# Patient Record
Sex: FEMALE | Race: OTHER
[De-identification: names, ages, dates, MRNs, and addresses within clinical notes are randomized per-mention and may not be internally consistent; named-entity substitution may affect disease eponyms.]

---

## 2017-05-19 ENCOUNTER — HOSPITAL ENCOUNTER (INPATIENT)
Dept: HOSPITAL 54 - ER | Age: 67
LOS: 3 days | Discharge: HOME | DRG: 69 | End: 2017-05-22
Attending: INTERNAL MEDICINE | Admitting: INTERNAL MEDICINE
Payer: MEDICARE

## 2017-05-19 VITALS — BODY MASS INDEX: 28.86 KG/M2 | WEIGHT: 147 LBS | HEIGHT: 60 IN

## 2017-05-19 VITALS — DIASTOLIC BLOOD PRESSURE: 78 MMHG | SYSTOLIC BLOOD PRESSURE: 151 MMHG

## 2017-05-19 DIAGNOSIS — I50.32: ICD-10-CM

## 2017-05-19 DIAGNOSIS — E78.5: ICD-10-CM

## 2017-05-19 DIAGNOSIS — N39.0: ICD-10-CM

## 2017-05-19 DIAGNOSIS — I70.0: ICD-10-CM

## 2017-05-19 DIAGNOSIS — D32.9: ICD-10-CM

## 2017-05-19 DIAGNOSIS — I25.10: ICD-10-CM

## 2017-05-19 DIAGNOSIS — R79.89: ICD-10-CM

## 2017-05-19 DIAGNOSIS — G89.29: ICD-10-CM

## 2017-05-19 DIAGNOSIS — M19.90: ICD-10-CM

## 2017-05-19 DIAGNOSIS — G45.9: Primary | ICD-10-CM

## 2017-05-19 DIAGNOSIS — M79.606: ICD-10-CM

## 2017-05-19 DIAGNOSIS — I11.0: ICD-10-CM

## 2017-05-19 DIAGNOSIS — E87.6: ICD-10-CM

## 2017-05-19 DIAGNOSIS — F17.210: ICD-10-CM

## 2017-05-19 LAB
ALBUMIN SERPL BCP-MCNC: 3.8 G/DL (ref 3.4–5)
ALP SERPL-CCNC: 63 U/L (ref 46–116)
ALT SERPL W P-5'-P-CCNC: 46 U/L (ref 12–78)
APTT PPP: 25 SEC (ref 23–34)
APTT PPP: 28 SEC (ref 23–34)
AST SERPL W P-5'-P-CCNC: 15 U/L (ref 15–37)
BASOPHILS # BLD AUTO: 0 /CMM (ref 0–0.2)
BASOPHILS # BLD AUTO: 0.1 /CMM (ref 0–0.2)
BASOPHILS NFR BLD AUTO: 0.6 % (ref 0–2)
BASOPHILS NFR BLD AUTO: 0.6 % (ref 0–2)
BILIRUB SERPL-MCNC: 0.5 MG/DL (ref 0.2–1)
BUN SERPL-MCNC: 22 MG/DL (ref 7–18)
BUN SERPL-MCNC: 24 MG/DL (ref 7–18)
CALCIUM SERPL-MCNC: 9.5 MG/DL (ref 8.5–10.1)
CALCIUM SERPL-MCNC: 9.8 MG/DL (ref 8.5–10.1)
CHLORIDE SERPL-SCNC: 106 MMOL/L (ref 98–107)
CHLORIDE SERPL-SCNC: 106 MMOL/L (ref 98–107)
CHOLEST SERPL-MCNC: 195 MG/DL (ref ?–200)
CO2 SERPL-SCNC: 28 MMOL/L (ref 21–32)
CO2 SERPL-SCNC: 34 MMOL/L (ref 21–32)
CREAT SERPL-MCNC: 1 MG/DL (ref 0.6–1.3)
CREAT SERPL-MCNC: 1.1 MG/DL (ref 0.6–1.3)
EOSINOPHIL # BLD AUTO: 0.3 /CMM (ref 0–0.7)
EOSINOPHIL # BLD AUTO: 0.3 /CMM (ref 0–0.7)
EOSINOPHIL NFR BLD AUTO: 3.2 % (ref 0–6)
EOSINOPHIL NFR BLD AUTO: 4.1 % (ref 0–6)
GLUCOSE SERPL-MCNC: 117 MG/DL (ref 74–106)
GLUCOSE SERPL-MCNC: 130 MG/DL (ref 74–106)
HCT VFR BLD AUTO: 39 % (ref 33–45)
HCT VFR BLD AUTO: 40 % (ref 33–45)
HDLC SERPL-MCNC: 62 MG/DL (ref 40–60)
HGB BLD-MCNC: 13 G/DL (ref 11.5–14.8)
HGB BLD-MCNC: 13.6 G/DL (ref 11.5–14.8)
INR PPP: 0.97 (ref 0.87–1.13)
INR PPP: 0.97 (ref 0.87–1.13)
LDLC SERPL DIRECT ASSAY-MCNC: 105 MG/DL (ref 0–99)
LYMPHOCYTES NFR BLD AUTO: 2 /CMM (ref 0.8–4.8)
LYMPHOCYTES NFR BLD AUTO: 2.2 /CMM (ref 0.8–4.8)
LYMPHOCYTES NFR BLD AUTO: 26.4 % (ref 20–44)
LYMPHOCYTES NFR BLD AUTO: 26.6 % (ref 20–44)
MCH RBC QN AUTO: 29 PG (ref 26–33)
MCH RBC QN AUTO: 30 PG (ref 26–33)
MCHC RBC AUTO-ENTMCNC: 34 G/DL (ref 31–36)
MCHC RBC AUTO-ENTMCNC: 34 G/DL (ref 31–36)
MCV RBC AUTO: 87 FL (ref 82–100)
MCV RBC AUTO: 87 FL (ref 82–100)
MONOCYTES NFR BLD AUTO: 0.5 /CMM (ref 0.1–1.3)
MONOCYTES NFR BLD AUTO: 0.6 /CMM (ref 0.1–1.3)
MONOCYTES NFR BLD AUTO: 5.7 % (ref 2–12)
MONOCYTES NFR BLD AUTO: 8.1 % (ref 2–12)
NEUTROPHILS # BLD AUTO: 4.6 /CMM (ref 1.8–8.9)
NEUTROPHILS # BLD AUTO: 5.3 /CMM (ref 1.8–8.9)
NEUTROPHILS NFR BLD AUTO: 60.6 % (ref 43–81)
NEUTROPHILS NFR BLD AUTO: 64.1 % (ref 43–81)
NT-PROBNP SERPL-MCNC: 398 PG/ML (ref 0–125)
PLATELET # BLD AUTO: 162 /CMM (ref 150–450)
PLATELET # BLD AUTO: 167 /CMM (ref 150–450)
POTASSIUM SERPL-SCNC: 3.3 MMOL/L (ref 3.5–5.1)
POTASSIUM SERPL-SCNC: 3.7 MMOL/L (ref 3.5–5.1)
PROT SERPL-MCNC: 7 G/DL (ref 6.4–8.2)
PROTHROMBIN TIME: 10.1 SECS (ref 9.5–12.7)
PROTHROMBIN TIME: 10.4 SECS (ref 9.5–12.7)
RBC # BLD AUTO: 4.43 MIL/UL (ref 4–5.2)
RBC # BLD AUTO: 4.56 MIL/UL (ref 4–5.2)
RDW COEFFICIENT OF VARIATION: 12.8 (ref 11.5–15)
RDW COEFFICIENT OF VARIATION: 13.1 (ref 11.5–15)
SODIUM SERPL-SCNC: 143 MMOL/L (ref 136–145)
SODIUM SERPL-SCNC: 144 MMOL/L (ref 136–145)
TRIGL SERPL-MCNC: 89 MG/DL (ref 30–150)
TROPONIN I SERPL-MCNC: < 0.017 NG/ML (ref 0–0.06)
TSH SERPL DL<=0.005 MIU/L-ACNC: 0.61 UIU/ML (ref 0.36–3.74)
WBC NRBC COR # BLD AUTO: 7.5 K/UL (ref 4.3–11)
WBC NRBC COR # BLD AUTO: 8.3 K/UL (ref 4.3–11)

## 2017-05-19 PROCEDURE — Z7610: HCPCS

## 2017-05-19 PROCEDURE — A4606 OXYGEN PROBE USED W OXIMETER: HCPCS

## 2017-05-19 RX ADMIN — Medication SCH EACH: at 23:03

## 2017-05-19 RX ADMIN — ENOXAPARIN SODIUM SCH MG: 40 INJECTION SUBCUTANEOUS at 22:35

## 2017-05-19 RX ADMIN — ATORVASTATIN CALCIUM SCH MG: 10 TABLET, FILM COATED ORAL at 21:00

## 2017-05-19 RX ADMIN — ASPIRIN 81 MG SCH MG: 81 TABLET ORAL at 22:56

## 2017-05-19 NOTE — NUR
CALLED [facility]'S TELESTROKE HOTLINE, SPOKE WITH ALLEN, PRESENTED PT, AWAITING 
CALL BACK FROM  (NEUROLOGIST)

## 2017-05-19 NOTE — NUR
PT [id number] FROM HOME:LOWER BACK PAIN. RIGHT LEG/ARM NUMBNESS. ANXIETY. PLACED ON 
MONITOR. AWAITING MD ORDER

## 2017-05-19 NOTE — NUR
TELE/RN NOTES



SPOKE WITH RACHEL MERRILL FROM RADIOLOGY. RACHEL STATED SHE SPOKE WITH DR. HENDRICKSON REGARDING PT. 
STAT CAROTID DUPLEX AND STAT ECHOCARDIOGRAM. ECHO TECH IS NOT HERE TONIGHT. SHE STATED PER 
DR. HENDRICKSON OK FOR CAROTID DUPLEX AND ECHO TO BE DONE TOMORROW. WILL CONTINUE TO MONITOR.

## 2017-05-19 NOTE — NUR
TELE/RN NOTES



RECEIVED PT. FROM ER. PT. IS AWAKE, ALERT AND ORIENTED X4. BREATHING EVEN AND UNLABORED ON 
ROOM AIR. NO SOB, RESPIRATORY DISTRESS OR COMPLAINTS OF PAIN NOTED AT THIS TIME. PT. DENIES 
ANY WEAKNESS OR HEADACHE. NEURO CHECK NORMAL. ORIENTED PT. TO ROOM. PLACED EXTERNAL CARDIAC 
MONITOR ON PT. CURRENT RHYTHM = SINUS RHYTHM HR 68. EDUCATED PT. ON STROKE S/S. PT. 
VERBALIZED UNDERSTANDING. SIDE RAILS PADDED. SEIZURE PRECAUTIONS IMPLEMENTED. BED IN LOWEST 
POSITION, CALL LIGHT WITHIN REACH, WILL CONTINUE TO MONITOR.

## 2017-05-20 VITALS — SYSTOLIC BLOOD PRESSURE: 135 MMHG | DIASTOLIC BLOOD PRESSURE: 65 MMHG

## 2017-05-20 VITALS — SYSTOLIC BLOOD PRESSURE: 146 MMHG | DIASTOLIC BLOOD PRESSURE: 79 MMHG

## 2017-05-20 VITALS — SYSTOLIC BLOOD PRESSURE: 151 MMHG | DIASTOLIC BLOOD PRESSURE: 75 MMHG

## 2017-05-20 VITALS — SYSTOLIC BLOOD PRESSURE: 154 MMHG | DIASTOLIC BLOOD PRESSURE: 79 MMHG

## 2017-05-20 VITALS — SYSTOLIC BLOOD PRESSURE: 130 MMHG | DIASTOLIC BLOOD PRESSURE: 61 MMHG

## 2017-05-20 VITALS — SYSTOLIC BLOOD PRESSURE: 127 MMHG | DIASTOLIC BLOOD PRESSURE: 64 MMHG

## 2017-05-20 LAB
APPEARANCE UR: CLEAR
APTT PPP: 31 SEC (ref 23–34)
BASOPHILS # BLD AUTO: 0.1 /CMM (ref 0–0.2)
BASOPHILS NFR BLD AUTO: 0.7 % (ref 0–2)
BILIRUB UR QL STRIP: NEGATIVE
BUN SERPL-MCNC: 21 MG/DL (ref 7–18)
CALCIUM SERPL-MCNC: 9.4 MG/DL (ref 8.5–10.1)
CHLORIDE SERPL-SCNC: 106 MMOL/L (ref 98–107)
CO2 SERPL-SCNC: 30 MMOL/L (ref 21–32)
COLOR UR: YELLOW
CREAT SERPL-MCNC: 0.9 MG/DL (ref 0.6–1.3)
DEPRECATED SQUAMOUS URNS QL MICRO: (no result) /HPF
EOSINOPHIL # BLD AUTO: 0.3 /CMM (ref 0–0.7)
EOSINOPHIL NFR BLD AUTO: 3.7 % (ref 0–6)
GLUCOSE SERPL-MCNC: 105 MG/DL (ref 74–106)
GLUCOSE UR STRIP-MCNC: NEGATIVE MG/DL
HCT VFR BLD AUTO: 40 % (ref 33–45)
HGB BLD-MCNC: 13.5 G/DL (ref 11.5–14.8)
HGB UR QL STRIP: NEGATIVE ERY/UL
INR PPP: 1 (ref 0.87–1.13)
KETONES UR STRIP-MCNC: NEGATIVE MG/DL
LEUKOCYTE ESTERASE UR QL STRIP: (no result)
LYMPHOCYTES NFR BLD AUTO: 2.5 /CMM (ref 0.8–4.8)
LYMPHOCYTES NFR BLD AUTO: 31.8 % (ref 20–44)
MCH RBC QN AUTO: 30 PG (ref 26–33)
MCHC RBC AUTO-ENTMCNC: 34 G/DL (ref 31–36)
MCV RBC AUTO: 88 FL (ref 82–100)
MONOCYTES NFR BLD AUTO: 0.5 /CMM (ref 0.1–1.3)
MONOCYTES NFR BLD AUTO: 6.4 % (ref 2–12)
NEUTROPHILS # BLD AUTO: 4.5 /CMM (ref 1.8–8.9)
NEUTROPHILS NFR BLD AUTO: 57.4 % (ref 43–81)
NITRITE UR QL STRIP: NEGATIVE
PH UR STRIP: 5.5 [PH] (ref 5–8)
PLATELET # BLD AUTO: 159 /CMM (ref 150–450)
POTASSIUM SERPL-SCNC: 3.1 MMOL/L (ref 3.5–5.1)
PROT UR QL STRIP: NEGATIVE MG/DL
PROTHROMBIN TIME: 10.7 SECS (ref 9.5–12.7)
RBC # BLD AUTO: 4.55 MIL/UL (ref 4–5.2)
RBC #/AREA URNS HPF: (no result) /HPF (ref 0–2)
RDW COEFFICIENT OF VARIATION: 13.8 (ref 11.5–15)
SODIUM SERPL-SCNC: 144 MMOL/L (ref 136–145)
TROPONIN I SERPL-MCNC: < 0.017 NG/ML (ref 0–0.06)
TSH SERPL DL<=0.005 MIU/L-ACNC: 0.9 UIU/ML (ref 0.36–3.74)
UROBILINOGEN UR STRIP-MCNC: 0.2 EU/DL
WBC #/AREA URNS HPF: (no result) /HPF
WBC #/AREA URNS HPF: (no result) /HPF (ref 0–3)
WBC NRBC COR # BLD AUTO: 7.8 K/UL (ref 4.3–11)

## 2017-05-20 RX ADMIN — Medication SCH EACH: at 06:49

## 2017-05-20 RX ADMIN — Medication SCH EACH: at 17:32

## 2017-05-20 RX ADMIN — ATORVASTATIN CALCIUM SCH MG: 10 TABLET, FILM COATED ORAL at 09:34

## 2017-05-20 RX ADMIN — ASPIRIN 81 MG SCH MG: 81 TABLET ORAL at 09:34

## 2017-05-20 RX ADMIN — POTASSIUM CHLORIDE SCH MEQ: 1500 TABLET, EXTENDED RELEASE ORAL at 12:17

## 2017-05-20 RX ADMIN — Medication SCH EACH: at 22:31

## 2017-05-20 RX ADMIN — POTASSIUM CHLORIDE SCH MEQ: 1500 TABLET, EXTENDED RELEASE ORAL at 10:46

## 2017-05-20 RX ADMIN — ACETAMINOPHEN PRN MG: 325 TABLET ORAL at 10:33

## 2017-05-20 RX ADMIN — ENOXAPARIN SODIUM SCH MG: 40 INJECTION SUBCUTANEOUS at 21:26

## 2017-05-20 RX ADMIN — Medication SCH EACH: at 12:19

## 2017-05-20 RX ADMIN — ACETAMINOPHEN PRN MG: 325 TABLET ORAL at 18:05

## 2017-05-20 RX ADMIN — METOPROLOL TARTRATE SCH MG: 50 TABLET, FILM COATED ORAL at 21:26

## 2017-05-20 RX ADMIN — POTASSIUM CHLORIDE SCH MEQ: 1500 TABLET, EXTENDED RELEASE ORAL at 09:34

## 2017-05-20 NOTE — NUR
TELE/RN NOTES



PT. LYING IN BED RESTING. BREATHING EVEN AND UNLABORED ON ROOM AIR. NO SOB, RESPIRATORY 
DISTRESS OR COMPLAINTS OF PAIN NOTED AT THIS TIME. NO COMPLAINTS OF WEAKNESS OR HEADACHE AT 
THIS TIME. NEURO CHECKS PERFORMED Q4 HOURS. PT. WITH EXTERNAL CARDIAC MONITOR ON PT. CURRENT 
RHYTHM = SINUS ABNER HR 58. ALL PT. NEEDS MET. BED IN LOWEST POSITION, SIDE RAILS UP X2, ALL 
SIDE RAILS PADDED. SEIZURE AND ASPIRATION PRECAUTIONS IMPLEMENTED. CALL LIGHT WITHIN REACH, 
WILL ENDORSE TO DAYSHIFT NURSE FOR CONTINUITY OF CARE.

## 2017-05-20 NOTE — NUR
MS/RN CLOSING NOTES



PT. IS SITTING UP IN BED WATCHING TV AWAKE, A&OX4. NO S/S OF DISTRESS, NO SOB, PT. IS 
BREATHING ON ROOM AIR UNLABORED AND EVENLY. PT. HAS A LEFT ANTECUBITAL IV SITE INTACT. BED 
IS IN LOW POSITION, 2 SIDE RAILS UP, CALL LIGHT WITHIN REACH, AND ALL NEEDS ATTENDED TO. 
TYLENOL WAS GIVEN TO PT. FOR HEADACHE PAIN. WILL ENDORSE REPORT TO NIGHT SHIFT NURSE.

## 2017-05-20 NOTE — NUR
MS/RN OPENING NOTE



PT. IS LYING IN BED SLEEPING. NO S/S OF DISTRESS, NO SOB, BREATHING ON ROOM AIR EVENLY AND 
UNLABORED. BED IS IN LOW POSITION, 2 SIDE RAILS UP, CALL LIGHT WITHIN REACH, AND NIGHT SHIFT 
NURSE PROVIDED REPORT. PT. IS ON TELEMETRY WITH LEADS ON.

## 2017-05-21 VITALS — DIASTOLIC BLOOD PRESSURE: 76 MMHG | SYSTOLIC BLOOD PRESSURE: 139 MMHG

## 2017-05-21 VITALS — DIASTOLIC BLOOD PRESSURE: 86 MMHG | SYSTOLIC BLOOD PRESSURE: 156 MMHG

## 2017-05-21 VITALS — SYSTOLIC BLOOD PRESSURE: 140 MMHG | DIASTOLIC BLOOD PRESSURE: 70 MMHG

## 2017-05-21 VITALS — SYSTOLIC BLOOD PRESSURE: 164 MMHG | DIASTOLIC BLOOD PRESSURE: 83 MMHG

## 2017-05-21 VITALS — SYSTOLIC BLOOD PRESSURE: 138 MMHG | DIASTOLIC BLOOD PRESSURE: 81 MMHG

## 2017-05-21 VITALS — DIASTOLIC BLOOD PRESSURE: 91 MMHG | SYSTOLIC BLOOD PRESSURE: 154 MMHG

## 2017-05-21 VITALS — DIASTOLIC BLOOD PRESSURE: 89 MMHG | SYSTOLIC BLOOD PRESSURE: 136 MMHG

## 2017-05-21 LAB
BUN SERPL-MCNC: 16 MG/DL (ref 7–18)
CALCIUM SERPL-MCNC: 9.8 MG/DL (ref 8.5–10.1)
CHLORIDE SERPL-SCNC: 104 MMOL/L (ref 98–107)
CO2 SERPL-SCNC: 29 MMOL/L (ref 21–32)
CREAT SERPL-MCNC: 0.8 MG/DL (ref 0.6–1.3)
GLUCOSE SERPL-MCNC: 109 MG/DL (ref 74–106)
POTASSIUM SERPL-SCNC: 3.8 MMOL/L (ref 3.5–5.1)
SODIUM SERPL-SCNC: 142 MMOL/L (ref 136–145)

## 2017-05-21 RX ADMIN — Medication SCH EACH: at 08:10

## 2017-05-21 RX ADMIN — METOPROLOL TARTRATE SCH MG: 50 TABLET, FILM COATED ORAL at 08:12

## 2017-05-21 RX ADMIN — Medication SCH EACH: at 13:01

## 2017-05-21 RX ADMIN — METOPROLOL TARTRATE SCH MG: 50 TABLET, FILM COATED ORAL at 20:50

## 2017-05-21 RX ADMIN — Medication SCH EACH: at 17:23

## 2017-05-21 RX ADMIN — ATORVASTATIN CALCIUM SCH MG: 10 TABLET, FILM COATED ORAL at 08:11

## 2017-05-21 RX ADMIN — ACETAMINOPHEN PRN MG: 325 TABLET ORAL at 08:39

## 2017-05-21 RX ADMIN — Medication SCH EACH: at 22:20

## 2017-05-21 RX ADMIN — ASPIRIN 81 MG SCH MG: 81 TABLET ORAL at 08:11

## 2017-05-21 RX ADMIN — ENOXAPARIN SODIUM SCH MG: 40 INJECTION SUBCUTANEOUS at 20:51

## 2017-05-21 RX ADMIN — NICOTINE SCH MG: 7 PATCH, EXTENDED RELEASE TOPICAL at 13:01

## 2017-05-21 NOTE — NUR
ms rn Initial notes



Received patient in bed, awake, head of bed elevated, no SOB or distress noted. Alert and 
oriented x 3, verbally responsive and able to make needs known.  IV intact and patent. Kept 
patient clean and comfortable in bed, call light with in patient reach, will continue to 
monitor accordingly.

## 2017-05-21 NOTE — NUR
MS RN NOTES



Jose Luis NP came seen and examined the patient and ordered Ativan 0.5 mg 1 tab PO x 1only prior 
MRI of the Brain. Norco 5/325 mg 1 tab PO Q6hrs PRN. All orders carried out and noted.  Will 
continue to monitor patient accordingly.

## 2017-05-21 NOTE — NUR
MS/RN NOTES



BLOOD SUGAR=101

NO INSULIN COVERAGE ORDERED. SNACKS PROVIDED AT BEDSIDE. WILL MONITOR FOR S/S OF 
HYPO/HYPERGLYCEMIA

## 2017-05-21 NOTE — NUR
MS/RN OPENING NOTES



PT ASLEEP, RESTING COMFORTABLY IN BED, EASILY AROUSABLE TO NEARBY NOISE. ON ROOM AIR WITH NO 
DISTRESS NOTED. BREATHING EVEN AND UNLABORED. PT IS A/OX4. DENIES PAIN AT THIS TIME. IV TO 
LAC PATENT AND INTACT. ABLE TO MAKE NEEDS KNOWN. BED IN LOW/LOCKED POSITION WITH CALL LIGHT 
IN REACH. BED RAILS UPX2. WILL CONTINUE TO MONITOR

## 2017-05-21 NOTE — NUR
ms rn notes



Dr. Brown came seen and examined the patient and ordered Nicotine patch 7mg Q24hrs. All 
orders carried out and noted. Will continue to monitor patient accordingly.

## 2017-05-21 NOTE — NUR
ms rn closing notes



All needs provided, attended, and anticipated. Kept patient clean and comfortable in bed, 
call light with in patient reach, will continue to monitor accordingly. Endorsed to next 
shift RN to continue care.

## 2017-05-22 VITALS — DIASTOLIC BLOOD PRESSURE: 84 MMHG | SYSTOLIC BLOOD PRESSURE: 184 MMHG

## 2017-05-22 VITALS — SYSTOLIC BLOOD PRESSURE: 184 MMHG | DIASTOLIC BLOOD PRESSURE: 84 MMHG

## 2017-05-22 RX ADMIN — ATORVASTATIN CALCIUM SCH MG: 10 TABLET, FILM COATED ORAL at 08:08

## 2017-05-22 RX ADMIN — Medication SCH EACH: at 12:00

## 2017-05-22 RX ADMIN — METOPROLOL TARTRATE SCH MG: 50 TABLET, FILM COATED ORAL at 08:07

## 2017-05-22 RX ADMIN — NICOTINE SCH MG: 7 PATCH, EXTENDED RELEASE TOPICAL at 08:09

## 2017-05-22 RX ADMIN — Medication SCH EACH: at 06:33

## 2017-05-22 RX ADMIN — ASPIRIN 81 MG SCH MG: 81 TABLET ORAL at 08:07

## 2017-05-22 RX ADMIN — ACETAMINOPHEN PRN MG: 325 TABLET ORAL at 07:40

## 2017-05-22 NOTE — NUR
MS/RN CLOSING NOTES



PT AWAKE, A/OX3. ON ROOM AIR, BREATHING EVEN AND UNLABORED. DENIES PAIN. IV TO LAC PATENT 
AND INTACT. NEURO CHECKS Q4H. NO ACUTE CHANGES NOTED. SLEPT WELL THROUGHOUT THE NIGHT. ALL 
NEEDS MET AND ATTENDED TO. MADE PT COMFORTABLE THROUGHOUT SHIFT. BED IN LOW/LOCKED POSITION 
WITH CALL LIGHT IN REACH. BED RAILS UP. WILL ENDORSE TO AM SHIFT SKY[place].

## 2017-05-22 NOTE — NUR
RN OPEN NOTES



RECEIVED REPORT FROM NIGHT SHIFT NURSE. WILL CONTINUE TO MONITOR AND ASSESS PATIENT 
CONDITION

## 2017-05-22 NOTE — NUR
RN DISCHARGE NOTES



PATIENT'S DISCHARGE ORDER RECEIVED AND CARRIED OUT. PATIENT VERBALIZED UNDERSTANDING AND 
ALLOWED TIME TO ASK QUESTIONS. NO CONCERNS REGRADING DISCHARGE. PATIENT RECEIVED 
PRESCRIPTION AT TIME OF DISCHARGE. ALL PERSONAL BELONGING WITH PATIENT AT TIME OF DISCHARGE. 
IV SITE REMOVED. ID BAND REMOVED. PATIENT PICKED UP BY HER  AND A PRIVATE CAR. 
PATIENT ESCORTED TO MAIN LOBBY WITH A CNA.

## 2018-08-03 ENCOUNTER — HOSPITAL ENCOUNTER (EMERGENCY)
Age: 68
Discharge: HOME | End: 2018-08-03

## 2018-08-03 ENCOUNTER — HOSPITAL ENCOUNTER (EMERGENCY)
Dept: HOSPITAL 91 - FTE | Age: 68
Discharge: HOME | End: 2018-08-03
Payer: MEDICARE

## 2018-08-03 DIAGNOSIS — F17.210: ICD-10-CM

## 2018-08-03 DIAGNOSIS — M19.90: ICD-10-CM

## 2018-08-03 DIAGNOSIS — Z79.82: ICD-10-CM

## 2018-08-03 DIAGNOSIS — M79.672: Primary | ICD-10-CM

## 2018-08-03 DIAGNOSIS — I10: ICD-10-CM

## 2018-08-03 LAB
ADD MAN DIFF?: NO
ALANINE AMINOTRANSFERASE: 32 IU/L (ref 13–69)
ALBUMIN/GLOBULIN RATIO: 1.39
ALBUMIN: 4.6 G/DL (ref 3.3–4.9)
ALKALINE PHOSPHATASE: 55 IU/L (ref 42–121)
ANION GAP: 12 (ref 8–16)
ASPARTATE AMINO TRANSFERASE: 25 IU/L (ref 15–46)
BASOPHIL #: 0.1 10^3/UL (ref 0–0.1)
BASOPHILS %: 0.5 % (ref 0–2)
BILIRUBIN,DIRECT: 0 MG/DL (ref 0–0.2)
BILIRUBIN,TOTAL: 0.7 MG/DL (ref 0.2–1.3)
BLOOD UREA NITROGEN: 24 MG/DL (ref 7–20)
C-REACTIVE PROTEIN: 3.4 MG/DL (ref 0–0.9)
CALCIUM: 10.6 MG/DL (ref 8.4–10.2)
CARBON DIOXIDE: 34 MMOL/L (ref 21–31)
CHLORIDE: 98 MMOL/L (ref 97–110)
CREATININE: 0.95 MG/DL (ref 0.44–1)
EOSINOPHILS #: 0.2 10^3/UL (ref 0–0.5)
EOSINOPHILS %: 1.6 % (ref 0–7)
ERYTHROCYTE SEDIMENTATION RATE: 26 MM/HR (ref 0–30)
GLOBULIN: 3.3 G/DL (ref 1.3–3.2)
GLUCOSE: 91 MG/DL (ref 70–220)
HEMATOCRIT: 41.4 % (ref 37–47)
HEMOGLOBIN: 14.1 G/DL (ref 12–16)
LYMPHOCYTES #: 2.7 10^3/UL (ref 0.8–2.9)
LYMPHOCYTES %: 28.2 % (ref 15–51)
MEAN CORPUSCULAR HEMOGLOBIN: 30.2 PG (ref 29–33)
MEAN CORPUSCULAR HGB CONC: 34.1 G/DL (ref 32–37)
MEAN CORPUSCULAR VOLUME: 88.7 FL (ref 82–101)
MEAN PLATELET VOLUME: 11.9 FL (ref 7.4–10.4)
MONOCYTE #: 0.8 10^3/UL (ref 0.3–0.9)
MONOCYTES %: 7.9 % (ref 0–11)
NEUTROPHIL #: 5.9 10^3/UL (ref 1.6–7.5)
NEUTROPHILS %: 61.5 % (ref 39–77)
NUCLEATED RED BLOOD CELLS #: 0 10^3/UL (ref 0–0)
NUCLEATED RED BLOOD CELLS%: 0 /100WBC (ref 0–0)
PLATELET COUNT: 181 10^3/UL (ref 140–415)
POTASSIUM: 3.8 MMOL/L (ref 3.5–5.1)
RED BLOOD COUNT: 4.67 10^6/UL (ref 4.2–5.4)
RED CELL DISTRIBUTION WIDTH: 13.1 % (ref 11.5–14.5)
SODIUM: 140 MMOL/L (ref 135–144)
TOTAL PROTEIN: 7.9 G/DL (ref 6.1–8.1)
WHITE BLOOD COUNT: 9.6 10^3/UL (ref 4.8–10.8)

## 2018-08-03 PROCEDURE — 80053 COMPREHEN METABOLIC PANEL: CPT

## 2018-08-03 PROCEDURE — 73630 X-RAY EXAM OF FOOT: CPT

## 2018-08-03 PROCEDURE — 85651 RBC SED RATE NONAUTOMATED: CPT

## 2018-08-03 PROCEDURE — 93971 EXTREMITY STUDY: CPT

## 2018-08-03 PROCEDURE — 73610 X-RAY EXAM OF ANKLE: CPT

## 2018-08-03 PROCEDURE — 99285 EMERGENCY DEPT VISIT HI MDM: CPT

## 2018-08-03 PROCEDURE — 86140 C-REACTIVE PROTEIN: CPT

## 2018-08-03 PROCEDURE — 85025 COMPLETE CBC W/AUTO DIFF WBC: CPT

## 2018-08-03 RX ADMIN — HYDROCODONE BITARTRATE AND ACETAMINOPHEN 1 TAB: 10; 325 TABLET ORAL at 04:32

## 2021-08-23 ENCOUNTER — HOSPITAL ENCOUNTER (INPATIENT)
Dept: HOSPITAL 54 - ER | Age: 71
LOS: 2 days | Discharge: HOME | DRG: 149 | End: 2021-08-25
Attending: INTERNAL MEDICINE | Admitting: INTERNAL MEDICINE
Payer: MEDICARE

## 2021-08-23 VITALS — WEIGHT: 128 LBS | HEIGHT: 60 IN | BODY MASS INDEX: 25.13 KG/M2

## 2021-08-23 DIAGNOSIS — Z20.822: ICD-10-CM

## 2021-08-23 DIAGNOSIS — G89.29: ICD-10-CM

## 2021-08-23 DIAGNOSIS — Z86.73: ICD-10-CM

## 2021-08-23 DIAGNOSIS — I11.0: ICD-10-CM

## 2021-08-23 DIAGNOSIS — Z87.891: ICD-10-CM

## 2021-08-23 DIAGNOSIS — I50.32: ICD-10-CM

## 2021-08-23 DIAGNOSIS — D32.9: ICD-10-CM

## 2021-08-23 DIAGNOSIS — E78.5: ICD-10-CM

## 2021-08-23 DIAGNOSIS — R91.8: ICD-10-CM

## 2021-08-23 DIAGNOSIS — N39.0: ICD-10-CM

## 2021-08-23 DIAGNOSIS — Z79.82: ICD-10-CM

## 2021-08-23 DIAGNOSIS — I25.10: ICD-10-CM

## 2021-08-23 DIAGNOSIS — H81.10: Primary | ICD-10-CM

## 2021-08-23 LAB
BASOPHILS # BLD AUTO: 0 K/UL (ref 0–0.2)
BASOPHILS NFR BLD AUTO: 1.2 % (ref 0–2)
EOSINOPHIL NFR BLD AUTO: 0.1 % (ref 0–6)
HCT VFR BLD AUTO: 39 % (ref 33–45)
HGB BLD-MCNC: 13.4 G/DL (ref 11.5–14.8)
LYMPHOCYTES NFR BLD AUTO: 0.4 K/UL (ref 0.8–4.8)
LYMPHOCYTES NFR BLD AUTO: 10.5 % (ref 20–44)
MCHC RBC AUTO-ENTMCNC: 35 G/DL (ref 31–36)
MCV RBC AUTO: 86 FL (ref 82–100)
MONOCYTES NFR BLD AUTO: 0.3 K/UL (ref 0.1–1.3)
MONOCYTES NFR BLD AUTO: 9 % (ref 2–12)
NEUTROPHILS # BLD AUTO: 3 K/UL (ref 1.8–8.9)
NEUTROPHILS NFR BLD AUTO: 79.2 % (ref 43–81)
PLATELET # BLD AUTO: 137 K/UL (ref 150–450)
RBC # BLD AUTO: 4.51 MIL/UL (ref 4–5.2)
WBC NRBC COR # BLD AUTO: 3.8 K/UL (ref 4.3–11)

## 2021-08-23 PROCEDURE — G0378 HOSPITAL OBSERVATION PER HR: HCPCS

## 2021-08-23 PROCEDURE — C9803 HOPD COVID-19 SPEC COLLECT: HCPCS

## 2021-08-23 PROCEDURE — U0003 INFECTIOUS AGENT DETECTION BY NUCLEIC ACID (DNA OR RNA); SEVERE ACUTE RESPIRATORY SYNDROME CORONAVIRUS 2 (SARS-COV-2) (CORONAVIRUS DISEASE [COVID-19]), AMPLIFIED PROBE TECHNIQUE, MAKING USE OF HIGH THROUGHPUT TECHNOLOGIES AS DESCRIBED BY CMS-2020-01-R: HCPCS

## 2021-08-23 NOTE — NUR
BIBS FOR C/O DIZZINESS X 2 DAYS. 1ST DOSE PFIZER TAKE 8/22/21. PATIENT A/OX4.  
ON ROOM AIR TOLERATING WELL AT SPO2 95%.

## 2021-08-24 VITALS — SYSTOLIC BLOOD PRESSURE: 116 MMHG | DIASTOLIC BLOOD PRESSURE: 69 MMHG

## 2021-08-24 VITALS — DIASTOLIC BLOOD PRESSURE: 52 MMHG | SYSTOLIC BLOOD PRESSURE: 138 MMHG

## 2021-08-24 VITALS — SYSTOLIC BLOOD PRESSURE: 138 MMHG | DIASTOLIC BLOOD PRESSURE: 59 MMHG

## 2021-08-24 LAB
ALBUMIN SERPL BCP-MCNC: 3.7 G/DL (ref 3.4–5)
ALP SERPL-CCNC: 75 U/L (ref 46–116)
ALT SERPL W P-5'-P-CCNC: 34 U/L (ref 12–78)
AST SERPL W P-5'-P-CCNC: 23 U/L (ref 15–37)
BILIRUB DIRECT SERPL-MCNC: 0.2 MG/DL (ref 0–0.2)
BILIRUB SERPL-MCNC: 0.9 MG/DL (ref 0.2–1)
BUN SERPL-MCNC: 21 MG/DL (ref 7–18)
CALCIUM SERPL-MCNC: 9.8 MG/DL (ref 8.5–10.1)
CHLORIDE SERPL-SCNC: 98 MMOL/L (ref 98–107)
CHOLEST SERPL-MCNC: 175 MG/DL (ref ?–200)
CO2 SERPL-SCNC: 31 MMOL/L (ref 21–32)
COLOR UR: YELLOW
CREAT SERPL-MCNC: 1 MG/DL (ref 0.6–1.3)
GLUCOSE SERPL-MCNC: 108 MG/DL (ref 74–106)
HDLC SERPL-MCNC: 76 MG/DL (ref 40–60)
LDLC SERPL DIRECT ASSAY-MCNC: 73 MG/DL (ref 0–99)
PH UR STRIP: 6 [PH] (ref 5–8)
POTASSIUM SERPL-SCNC: 3.6 MMOL/L (ref 3.5–5.1)
PROT SERPL-MCNC: 8.2 G/DL (ref 6.4–8.2)
RBC #/AREA URNS HPF: (no result) /HPF (ref 0–2)
SODIUM SERPL-SCNC: 137 MMOL/L (ref 136–145)
TRIGL SERPL-MCNC: 144 MG/DL (ref 30–150)
TSH SERPL DL<=0.005 MIU/L-ACNC: 0.36 UIU/ML (ref 0.36–3.74)
UROBILINOGEN UR STRIP-MCNC: 0.2 EU/DL
WBC #/AREA URNS HPF: (no result) /HPF (ref 0–3)

## 2021-08-24 RX ADMIN — MECLIZINE HYDROCLORIDE SCH MG: 25 TABLET ORAL at 21:18

## 2021-08-24 RX ADMIN — PANTOPRAZOLE SODIUM SCH MG: 40 TABLET, DELAYED RELEASE ORAL at 07:33

## 2021-08-24 RX ADMIN — Medication SCH MG: at 09:00

## 2021-08-24 NOTE — NUR
RN NOTE

PATIENT OBSERVED IN AWAKE, ALERT AND ORIENTED X4, ABLE TO VERBALIZE NEEDS, ON TELE MONITOR, 
SR OF 78, ON ROOM AIR O2 SAT OF 98%, BREATHING EVEN AND UNLABORED, NIHSS ASSESSMENT DONE, 
AMBULATORY WITH SUPERVISION, NO PAIN COMPLAINS, IV SITE ON RIGHT AC PATENT FLUSHING WELL, 
SAFETY MEASURE OBSERVED, BED WHEELS LOCK, CALL LIGHT WITHIN REACH, WILL CONTINUE TO MONITOR.

-------------------------------------------------------------------------------

Addendum: 08/24/21 at 1856 by DESTINEE JIM RN

-------------------------------------------------------------------------------

RN NOTE

PATIENT OBSERVED IN AWAKE, ALERT AND ORIENTED X4, ABLE TO VERBALIZE NEEDS, ON TELE MONITOR, 
SR OF 78, ON ROOM AIR O2 SAT OF 98%, BREATHING EVEN AND UNLABORED, NIHSS ASSESSMENT DONE, 
AMBULATORY WITH SUPERVISION, NO PAIN COMPLAINS, IV SITE ON RIGHT AC PATENT FLUSHING WELL, 
SAFETY MEASURE OBSERVED, BED WHEELS LOCK, CALL LIGHT WITHIN REACH, WILL CONTINUE TO MONITOR. 
WILL ENDORSE TO NOC SHIFT.

## 2021-08-24 NOTE — NUR
RN OPENING NOTES:



RECEIVED PT A/OX4 IN BED RESTING COMFORTABLY. PATIENT IN NO S/SX OF ACUTE DISTRESS AT THIS 
TIME. NO SOB NOTED. PATIENT'S BREATHING IS EVEN AND UNLABORED. PATIENT IS ON ROOM AIR; 
TOLERATING WELL.

PATIENT ON TELE MONITORING READING SINUS  RHYTHM HR IS AT 89 AT THE TIME OF RECEIVED. 
PATIENT ON 2GM SODIUM DIET; TOLERATES WELL.  NOTED IV SITE ON R AC#20; PATENT, INTACT AND 
FLUSHING WELL; NO S/S OF INFECTION OR INFILTRATION. SAFETY MEASURES HAVE BEEN PROVIDED AND 
IMPLEMENTED. PATIENT BED ALARM IS ON. HEAD OF BED ELEVATED. BED IS LOCKED,  IN LOWEST 
POSITION AND SIDE RAILS UP. CALL LIGHT WITHIN REACH OF THE PATIENT. APPLICABLE ISOLATION 
PRECAUTIONS IN PLACE. WILL CONTINUE TO MONITOR AND REASSESS FOR ANY CHANGES AND WILL CARRY 
OUT ANY ONGOING AND ACTIVE MD ORDER.

## 2021-08-25 VITALS — DIASTOLIC BLOOD PRESSURE: 63 MMHG | SYSTOLIC BLOOD PRESSURE: 125 MMHG

## 2021-08-25 VITALS — DIASTOLIC BLOOD PRESSURE: 64 MMHG | SYSTOLIC BLOOD PRESSURE: 131 MMHG

## 2021-08-25 VITALS — DIASTOLIC BLOOD PRESSURE: 65 MMHG | SYSTOLIC BLOOD PRESSURE: 113 MMHG

## 2021-08-25 VITALS — DIASTOLIC BLOOD PRESSURE: 62 MMHG | SYSTOLIC BLOOD PRESSURE: 126 MMHG

## 2021-08-25 VITALS — DIASTOLIC BLOOD PRESSURE: 63 MMHG | SYSTOLIC BLOOD PRESSURE: 141 MMHG

## 2021-08-25 LAB
BASOPHILS # BLD AUTO: 0 K/UL (ref 0–0.2)
BASOPHILS NFR BLD AUTO: 1.8 % (ref 0–2)
BUN SERPL-MCNC: 15 MG/DL (ref 7–18)
CALCIUM SERPL-MCNC: 9.3 MG/DL (ref 8.5–10.1)
CHLORIDE SERPL-SCNC: 101 MMOL/L (ref 98–107)
CHOLEST SERPL-MCNC: 167 MG/DL (ref ?–200)
CO2 SERPL-SCNC: 29 MMOL/L (ref 21–32)
CREAT SERPL-MCNC: 0.8 MG/DL (ref 0.6–1.3)
EOSINOPHIL NFR BLD AUTO: 5.2 % (ref 0–6)
EOSINOPHIL NFR BLD MANUAL: 5 % (ref 0–4)
GLUCOSE SERPL-MCNC: 131 MG/DL (ref 74–106)
HCT VFR BLD AUTO: 37 % (ref 33–45)
HDLC SERPL-MCNC: 64 MG/DL (ref 40–60)
HGB BLD-MCNC: 12.7 G/DL (ref 11.5–14.8)
LDLC SERPL DIRECT ASSAY-MCNC: 78 MG/DL (ref 0–99)
LYMPHOCYTES NFR BLD AUTO: 0.6 K/UL (ref 0.8–4.8)
LYMPHOCYTES NFR BLD AUTO: 23.2 % (ref 20–44)
LYMPHOCYTES NFR BLD MANUAL: 18 % (ref 16–48)
MCHC RBC AUTO-ENTMCNC: 34 G/DL (ref 31–36)
MCV RBC AUTO: 86 FL (ref 82–100)
MONOCYTES NFR BLD AUTO: 0.4 K/UL (ref 0.1–1.3)
MONOCYTES NFR BLD AUTO: 16.3 % (ref 2–12)
MONOCYTES NFR BLD MANUAL: 9 % (ref 0–11)
NEUTROPHILS # BLD AUTO: 1.3 K/UL (ref 1.8–8.9)
NEUTROPHILS NFR BLD AUTO: 53.5 % (ref 43–81)
NEUTS SEG NFR BLD MANUAL: 68 % (ref 42–76)
PLATELET # BLD AUTO: 122 K/UL (ref 150–450)
POTASSIUM SERPL-SCNC: 3 MMOL/L (ref 3.5–5.1)
RBC # BLD AUTO: 4.32 MIL/UL (ref 4–5.2)
SODIUM SERPL-SCNC: 138 MMOL/L (ref 136–145)
TRIGL SERPL-MCNC: 123 MG/DL (ref 30–150)
WBC NRBC COR # BLD AUTO: 2.5 K/UL (ref 4.3–11)

## 2021-08-25 RX ADMIN — MECLIZINE HYDROCLORIDE SCH MG: 25 TABLET ORAL at 12:14

## 2021-08-25 RX ADMIN — POTASSIUM CHLORIDE SCH MEQ: 1500 TABLET, EXTENDED RELEASE ORAL at 13:21

## 2021-08-25 RX ADMIN — POTASSIUM CHLORIDE SCH MEQ: 1500 TABLET, EXTENDED RELEASE ORAL at 12:14

## 2021-08-25 RX ADMIN — Medication SCH MG: at 08:22

## 2021-08-25 RX ADMIN — PANTOPRAZOLE SODIUM SCH MG: 40 TABLET, DELAYED RELEASE ORAL at 07:46

## 2021-08-25 RX ADMIN — POTASSIUM CHLORIDE SCH MEQ: 1500 TABLET, EXTENDED RELEASE ORAL at 11:04

## 2021-08-25 RX ADMIN — MECLIZINE HYDROCLORIDE SCH MG: 25 TABLET ORAL at 05:12

## 2021-08-25 NOTE — NUR
RN NOTES



CALLBACK DONE TO PT'S DAUGHTER (WANDY-), PROVIDED GENERAL UPDATES ABOUT PT. 
WANDY WAS ASKING FOR ANY DC PLAN FOR HER MOM, RN MENTIONED THAT THERE'S NO DC PLAN AT THIS 
TIME AND MD WOULD STILL WANT TO DO SOME TEST/PROCEDURE LIKE CTA OF THE BRAIN. DAUGHTER 
ACKNOWLEDGED AND VERY THANKFUL. RN ADVISED PT'S DAUGHTER TO CALLBACK LATER TODAY TO GET MORE 
CONCRETE INFO AND UPDATES FROM MD. WANDY ACKNOWLEDGED.

## 2021-08-25 NOTE — NUR
RN NOTES



PATIENT REMAINED TO BE IN NO SIGNS OF ACUTE RESPIRATORY DISTRESS , VITAL SIGNS WNL AT THIS 
TIME. CHARGE RN MADE AWARE. WILL CONTINUE TO MONITOR AND REASSESS FOR ANY CHANGES THROUGHOUT 
THE SHIFT.

## 2021-08-25 NOTE — NUR
RN NOTES



NO NOTED CHANGES IN PATIENT CONDITION AT THIS TIME; PATIENT VITALS STABLE, NO SIGNS OF ACUTE 
RESPIRATORY DISTRESS. AM PATIENT CARE RENDERED. CHARGE RN MADE AWARE. WILL CONTINUE TO 
MONITOR AND REASSESS FOR ANY CHANGES THROUGHOUT THE SHIFT.

## 2021-08-25 NOTE — NUR
RN CLOSING NOTE: 



PATIENT REMAINS IN ROOM IN NO SIGNS OF RESPIRATORY DISTRESS, PATIENT STILL ROOM 
AIR;TOLERATING WELL SATURATING @ >95% SP02. SAFETY MEASURES IMPLEMENTED, BED IN LOWEST 
POSITION, LOCKED, SIDE RAILS UP, CALL LIGHT WITHIN REACH. ALL NEEDS AND ORDERS ADDRESSED 
DURING THE SHIFT. IV ACCESS MAINTAINED INTACT, SECURED AND FLUSHING WELL.  ALL DUE MEDS 
GIVEN AS ORDERED & SCHEDULED ; PATIENT TOLERATED WELL. PATIENT KEPT CLEAN AND COMFORTABLE 
WITHIN THE SHIFT. PATIENT ENDORSED TO INCOMING SHIFT RN WITH STABLE VITAL SIGN AND FOR 
CONTINUITY OF CARE.

## 2021-08-25 NOTE — NUR
PT WAS DISCHARGED PER MD ORDER. IV DISCHARGED AND DRY DRESSING APPLIED. NO SIGNS OF 
INFECTION DISCHARGE INSTRUCTIONS GIVEN WITH FOLLOW UP CARE INSTRUCTIONS.

## 2021-08-27 NOTE — NUR
note:

/

 consultation received on 08/24/21 for possible TIA.  Patient was discharged 
home on 08/25/21, before  was able to complete the consultation.

## 2022-06-19 ENCOUNTER — HOSPITAL ENCOUNTER (EMERGENCY)
Dept: HOSPITAL 54 - ER | Age: 72
Discharge: HOME | End: 2022-06-19
Payer: MEDICARE

## 2022-06-19 VITALS — BODY MASS INDEX: 22.16 KG/M2 | WEIGHT: 133 LBS | HEIGHT: 65 IN

## 2022-06-19 VITALS — DIASTOLIC BLOOD PRESSURE: 74 MMHG | SYSTOLIC BLOOD PRESSURE: 149 MMHG

## 2022-06-19 DIAGNOSIS — J20.9: Primary | ICD-10-CM

## 2022-06-19 DIAGNOSIS — Z79.82: ICD-10-CM

## 2022-06-19 DIAGNOSIS — R91.8: ICD-10-CM

## 2022-06-19 DIAGNOSIS — Z86.73: ICD-10-CM

## 2022-06-19 DIAGNOSIS — Z20.822: ICD-10-CM

## 2022-06-19 DIAGNOSIS — Z85.818: ICD-10-CM

## 2022-06-19 DIAGNOSIS — M54.9: ICD-10-CM

## 2022-06-19 DIAGNOSIS — G89.29: ICD-10-CM

## 2022-06-19 DIAGNOSIS — F17.210: ICD-10-CM

## 2022-06-19 DIAGNOSIS — I10: ICD-10-CM

## 2022-06-19 LAB
ALBUMIN SERPL BCP-MCNC: 3.8 G/DL (ref 3.4–5)
ALP SERPL-CCNC: 74 U/L (ref 46–116)
ALT SERPL W P-5'-P-CCNC: 35 U/L (ref 12–78)
AST SERPL W P-5'-P-CCNC: 26 U/L (ref 15–37)
BASOPHILS # BLD AUTO: 0 K/UL (ref 0–0.2)
BASOPHILS NFR BLD AUTO: 0.2 % (ref 0–2)
BILIRUB DIRECT SERPL-MCNC: 0.1 MG/DL (ref 0–0.2)
BILIRUB SERPL-MCNC: 0.4 MG/DL (ref 0.2–1)
BUN SERPL-MCNC: 19 MG/DL (ref 7–18)
CALCIUM SERPL-MCNC: 10.1 MG/DL (ref 8.5–10.1)
CHLORIDE SERPL-SCNC: 103 MMOL/L (ref 98–107)
CO2 SERPL-SCNC: 27 MMOL/L (ref 21–32)
CREAT SERPL-MCNC: 0.8 MG/DL (ref 0.6–1.3)
EOSINOPHIL NFR BLD AUTO: 12.6 % (ref 0–6)
GLUCOSE SERPL-MCNC: 108 MG/DL (ref 74–106)
HCT VFR BLD AUTO: 41 % (ref 33–45)
HGB BLD-MCNC: 14.3 G/DL (ref 11.5–14.8)
LYMPHOCYTES NFR BLD AUTO: 1.2 K/UL (ref 0.8–4.8)
LYMPHOCYTES NFR BLD AUTO: 23.6 % (ref 20–44)
MCHC RBC AUTO-ENTMCNC: 35 G/DL (ref 31–36)
MCV RBC AUTO: 87 FL (ref 82–100)
MONOCYTES NFR BLD AUTO: 0.5 K/UL (ref 0.1–1.3)
MONOCYTES NFR BLD AUTO: 10.4 % (ref 2–12)
NEUTROPHILS # BLD AUTO: 2.8 K/UL (ref 1.8–8.9)
NEUTROPHILS NFR BLD AUTO: 53.2 % (ref 43–81)
PLATELET # BLD AUTO: 153 K/UL (ref 150–450)
POTASSIUM SERPL-SCNC: 3.7 MMOL/L (ref 3.5–5.1)
PROT SERPL-MCNC: 7.3 G/DL (ref 6.4–8.2)
RBC # BLD AUTO: 4.73 MIL/UL (ref 4–5.2)
SODIUM SERPL-SCNC: 139 MMOL/L (ref 136–145)
WBC NRBC COR # BLD AUTO: 5.3 K/UL (ref 4.3–11)

## 2022-06-19 PROCEDURE — 94640 AIRWAY INHALATION TREATMENT: CPT

## 2022-06-19 PROCEDURE — 99285 EMERGENCY DEPT VISIT HI MDM: CPT

## 2022-06-19 PROCEDURE — 87804 INFLUENZA ASSAY W/OPTIC: CPT

## 2022-06-19 PROCEDURE — C9803 HOPD COVID-19 SPEC COLLECT: HCPCS

## 2022-06-19 PROCEDURE — 80048 BASIC METABOLIC PNL TOTAL CA: CPT

## 2022-06-19 PROCEDURE — 87040 BLOOD CULTURE FOR BACTERIA: CPT

## 2022-06-19 PROCEDURE — 85378 FIBRIN DEGRADE SEMIQUANT: CPT

## 2022-06-19 PROCEDURE — 96374 THER/PROPH/DIAG INJ IV PUSH: CPT

## 2022-06-19 PROCEDURE — 87426 SARSCOV CORONAVIRUS AG IA: CPT

## 2022-06-19 PROCEDURE — 36415 COLL VENOUS BLD VENIPUNCTURE: CPT

## 2022-06-19 PROCEDURE — 83880 ASSAY OF NATRIURETIC PEPTIDE: CPT

## 2022-06-19 PROCEDURE — 85025 COMPLETE CBC W/AUTO DIFF WBC: CPT

## 2022-06-19 PROCEDURE — 80076 HEPATIC FUNCTION PANEL: CPT

## 2022-06-19 PROCEDURE — 71275 CT ANGIOGRAPHY CHEST: CPT

## 2022-06-19 PROCEDURE — 84484 ASSAY OF TROPONIN QUANT: CPT

## 2022-06-19 PROCEDURE — 71045 X-RAY EXAM CHEST 1 VIEW: CPT

## 2023-01-17 ENCOUNTER — HOSPITAL ENCOUNTER (EMERGENCY)
Dept: HOSPITAL 54 - ER | Age: 73
Discharge: HOME | End: 2023-01-17
Payer: MEDICARE

## 2023-01-17 VITALS — WEIGHT: 133 LBS | HEIGHT: 60 IN | BODY MASS INDEX: 26.11 KG/M2

## 2023-01-17 VITALS — SYSTOLIC BLOOD PRESSURE: 128 MMHG | DIASTOLIC BLOOD PRESSURE: 62 MMHG

## 2023-01-17 DIAGNOSIS — I10: ICD-10-CM

## 2023-01-17 DIAGNOSIS — J45.901: Primary | ICD-10-CM

## 2023-01-17 DIAGNOSIS — G89.29: ICD-10-CM

## 2023-01-17 DIAGNOSIS — F17.200: ICD-10-CM

## 2023-01-17 DIAGNOSIS — Z79.899: ICD-10-CM

## 2023-01-17 PROCEDURE — 96365 THER/PROPH/DIAG IV INF INIT: CPT

## 2023-01-17 PROCEDURE — 94644 CONT INHLJ TX 1ST HOUR: CPT

## 2023-01-17 PROCEDURE — 96375 TX/PRO/DX INJ NEW DRUG ADDON: CPT

## 2023-01-17 PROCEDURE — 94799 UNLISTED PULMONARY SVC/PX: CPT

## 2023-01-17 PROCEDURE — 99285 EMERGENCY DEPT VISIT HI MDM: CPT

## 2023-01-17 PROCEDURE — 96366 THER/PROPH/DIAG IV INF ADDON: CPT

## 2023-01-17 RX ADMIN — MAGNESIUM SULFATE IN DEXTROSE SCH MLS/HR: 10 INJECTION, SOLUTION INTRAVENOUS at 15:00

## 2023-01-17 RX ADMIN — MAGNESIUM SULFATE IN DEXTROSE SCH MLS/HR: 10 INJECTION, SOLUTION INTRAVENOUS at 16:18

## 2023-05-11 ENCOUNTER — HOSPITAL ENCOUNTER (EMERGENCY)
Dept: HOSPITAL 54 - ER | Age: 73
Discharge: HOME | End: 2023-05-11
Payer: MEDICARE

## 2023-05-11 VITALS — DIASTOLIC BLOOD PRESSURE: 71 MMHG | SYSTOLIC BLOOD PRESSURE: 132 MMHG

## 2023-05-11 VITALS — WEIGHT: 132 LBS | HEIGHT: 60 IN | BODY MASS INDEX: 25.91 KG/M2

## 2023-05-11 DIAGNOSIS — J98.01: Primary | ICD-10-CM

## 2023-05-11 DIAGNOSIS — Z79.899: ICD-10-CM

## 2023-05-11 DIAGNOSIS — G89.29: ICD-10-CM

## 2023-05-11 DIAGNOSIS — Z86.73: ICD-10-CM

## 2023-05-11 DIAGNOSIS — F17.200: ICD-10-CM

## 2023-05-11 DIAGNOSIS — I10: ICD-10-CM

## 2023-05-11 PROCEDURE — 94644 CONT INHLJ TX 1ST HOUR: CPT

## 2023-05-11 PROCEDURE — 96375 TX/PRO/DX INJ NEW DRUG ADDON: CPT

## 2023-05-11 PROCEDURE — 99285 EMERGENCY DEPT VISIT HI MDM: CPT

## 2023-05-11 PROCEDURE — 96365 THER/PROPH/DIAG IV INF INIT: CPT

## 2023-05-11 NOTE — NUR
ADDENDUM:

Intravenous End Time Documentation:

Magnesium 1 gram IVPB premix : start time:  0537; end time: 0607 : IV site:  
PIV # 20 Port # 1

## 2024-02-27 ENCOUNTER — HOSPITAL ENCOUNTER (EMERGENCY)
Dept: HOSPITAL 54 - ER | Age: 74
LOS: 1 days | Discharge: HOME | End: 2024-02-28
Payer: MEDICARE

## 2024-02-27 VITALS — BODY MASS INDEX: 27.48 KG/M2 | WEIGHT: 140 LBS | HEIGHT: 60 IN

## 2024-02-27 DIAGNOSIS — Z98.890: ICD-10-CM

## 2024-02-27 DIAGNOSIS — Z79.82: ICD-10-CM

## 2024-02-27 DIAGNOSIS — R42: Primary | ICD-10-CM

## 2024-02-27 DIAGNOSIS — Z79.899: ICD-10-CM

## 2024-02-27 DIAGNOSIS — F17.200: ICD-10-CM

## 2024-02-27 DIAGNOSIS — I10: ICD-10-CM

## 2024-02-27 DIAGNOSIS — J45.909: ICD-10-CM

## 2024-02-27 DIAGNOSIS — N39.0: ICD-10-CM

## 2024-02-27 LAB
ALBUMIN SERPL BCP-MCNC: 3.5 G/DL (ref 3.4–5)
ALP SERPL-CCNC: 66 U/L (ref 46–116)
ALT SERPL W P-5'-P-CCNC: 25 U/L (ref 12–78)
AST SERPL W P-5'-P-CCNC: 21 U/L (ref 15–37)
BACTERIA UR CULT: YES
BASOPHILS # BLD AUTO: 0.2 K/UL (ref 0–0.2)
BASOPHILS NFR BLD AUTO: 3.5 % (ref 0–2)
BILIRUB DIRECT SERPL-MCNC: 0.1 MG/DL (ref 0–0.2)
BILIRUB SERPL-MCNC: 0.3 MG/DL (ref 0.2–1)
BILIRUB UR QL STRIP: (no result)
BUN SERPL-MCNC: 20 MG/DL (ref 7–18)
CALCIUM SERPL-MCNC: 9.9 MG/DL (ref 8.5–10.1)
CHLORIDE SERPL-SCNC: 102 MMOL/L (ref 98–107)
CO2 SERPL-SCNC: 30 MMOL/L (ref 21–32)
COLOR UR: YELLOW
CREAT SERPL-MCNC: 1.1 MG/DL (ref 0.6–1.3)
EOSINOPHIL # BLD AUTO: 0.5 K/UL (ref 0–0.7)
EOSINOPHIL NFR BLD AUTO: 9 % (ref 0–6)
ERYTHROCYTE [DISTWIDTH] IN BLOOD BY AUTOMATED COUNT: 13.9 % (ref 11.5–15)
GLUCOSE SERPL-MCNC: 135 MG/DL (ref 74–106)
HCT VFR BLD AUTO: 40 % (ref 33–45)
HGB BLD-MCNC: 13.3 G/DL (ref 11.5–14.8)
LYMPHOCYTES NFR BLD AUTO: 1.3 K/UL (ref 0.8–4.8)
LYMPHOCYTES NFR BLD AUTO: 24 % (ref 20–44)
MCH RBC QN AUTO: 28 PG (ref 26–33)
MCHC RBC AUTO-ENTMCNC: 33 G/DL (ref 31–36)
MCV RBC AUTO: 84 FL (ref 82–100)
MONOCYTES NFR BLD AUTO: 0.4 K/UL (ref 0.1–1.3)
MONOCYTES NFR BLD AUTO: 7.1 % (ref 2–12)
NEUTROPHILS # BLD AUTO: 3.1 K/UL (ref 1.8–8.9)
NEUTROPHILS NFR BLD AUTO: 56.4 % (ref 43–81)
PH UR STRIP: 5 [PH] (ref 5–8)
PLATELET # BLD AUTO: 213 K/UL (ref 150–450)
POTASSIUM SERPL-SCNC: 3.3 MMOL/L (ref 3.5–5.1)
PROT SERPL-MCNC: 7.7 G/DL (ref 6.4–8.2)
RBC # BLD AUTO: 4.73 MIL/UL (ref 4–5.2)
RBC #/AREA URNS HPF: (no result) /HPF (ref 0–2)
SODIUM SERPL-SCNC: 139 MMOL/L (ref 136–145)
SP GR UR STRIP: 1.03 (ref 1–1.03)
UROBILINOGEN UR STRIP-MCNC: 0.2 EU/DL
WBC NRBC COR # BLD AUTO: 5.5 K/UL (ref 4.3–11)

## 2024-02-27 PROCEDURE — 80076 HEPATIC FUNCTION PANEL: CPT

## 2024-02-27 PROCEDURE — 36415 COLL VENOUS BLD VENIPUNCTURE: CPT

## 2024-02-27 PROCEDURE — 99284 EMERGENCY DEPT VISIT MOD MDM: CPT

## 2024-02-27 PROCEDURE — 81001 URINALYSIS AUTO W/SCOPE: CPT

## 2024-02-27 PROCEDURE — 87086 URINE CULTURE/COLONY COUNT: CPT

## 2024-02-27 PROCEDURE — 85025 COMPLETE CBC W/AUTO DIFF WBC: CPT

## 2024-02-27 PROCEDURE — 96374 THER/PROPH/DIAG INJ IV PUSH: CPT

## 2024-02-27 PROCEDURE — 80048 BASIC METABOLIC PNL TOTAL CA: CPT

## 2024-02-27 PROCEDURE — 96375 TX/PRO/DX INJ NEW DRUG ADDON: CPT

## 2024-02-27 RX ADMIN — SODIUM CHLORIDE ONE MG: 9 INJECTION, SOLUTION INTRAVENOUS at 20:48

## 2024-02-27 RX ADMIN — LORAZEPAM ONE MG: 2 INJECTION INTRAMUSCULAR; INTRAVENOUS at 22:50

## 2024-02-27 RX ADMIN — MECLIZINE ONE MG: 12.5 TABLET ORAL at 22:50

## 2024-02-28 VITALS — SYSTOLIC BLOOD PRESSURE: 154 MMHG | OXYGEN SATURATION: 97 % | TEMPERATURE: 98.2 F | DIASTOLIC BLOOD PRESSURE: 75 MMHG

## 2025-01-28 ENCOUNTER — HOSPITAL ENCOUNTER (EMERGENCY)
Dept: HOSPITAL 54 - ER | Age: 75
Discharge: HOME | End: 2025-01-28
Payer: MEDICARE

## 2025-01-28 VITALS — HEIGHT: 65 IN | BODY MASS INDEX: 24.99 KG/M2 | WEIGHT: 150 LBS

## 2025-01-28 VITALS — OXYGEN SATURATION: 100 %

## 2025-01-28 VITALS — OXYGEN SATURATION: 95 %

## 2025-01-28 VITALS — DIASTOLIC BLOOD PRESSURE: 79 MMHG | SYSTOLIC BLOOD PRESSURE: 135 MMHG | OXYGEN SATURATION: 100 %

## 2025-01-28 DIAGNOSIS — R00.0: ICD-10-CM

## 2025-01-28 DIAGNOSIS — Z79.82: ICD-10-CM

## 2025-01-28 DIAGNOSIS — Z79.899: ICD-10-CM

## 2025-01-28 DIAGNOSIS — I11.0: ICD-10-CM

## 2025-01-28 DIAGNOSIS — Z79.52: ICD-10-CM

## 2025-01-28 DIAGNOSIS — Z79.51: ICD-10-CM

## 2025-01-28 DIAGNOSIS — Z86.73: ICD-10-CM

## 2025-01-28 DIAGNOSIS — F17.200: ICD-10-CM

## 2025-01-28 DIAGNOSIS — J45.901: Primary | ICD-10-CM

## 2025-01-28 PROCEDURE — 93005 ELECTROCARDIOGRAM TRACING: CPT

## 2025-01-28 PROCEDURE — 94799 UNLISTED PULMONARY SVC/PX: CPT

## 2025-01-28 PROCEDURE — 73110 X-RAY EXAM OF WRIST: CPT

## 2025-01-28 PROCEDURE — 99406 BEHAV CHNG SMOKING 3-10 MIN: CPT

## 2025-01-28 PROCEDURE — 96365 THER/PROPH/DIAG IV INF INIT: CPT

## 2025-01-28 PROCEDURE — 94644 CONT INHLJ TX 1ST HOUR: CPT

## 2025-01-28 PROCEDURE — 99285 EMERGENCY DEPT VISIT HI MDM: CPT

## 2025-01-28 PROCEDURE — 73080 X-RAY EXAM OF ELBOW: CPT

## 2025-01-28 RX ADMIN — DILTIAZEM HYDROCHLORIDE ONE MG: 5 INJECTION INTRAVENOUS at 06:51

## 2025-01-28 RX ADMIN — Medication ONE MG: at 03:55

## 2025-01-28 RX ADMIN — SODIUM CHLORIDE ONE ML: 9 INJECTION, SOLUTION INTRAVENOUS at 06:44

## 2025-01-28 RX ADMIN — MAGNESIUM SULFATE IN DEXTROSE ONE MLS/HR: 10 INJECTION, SOLUTION INTRAVENOUS at 05:29

## 2025-01-28 RX ADMIN — ALBUTEROL SULFATE ONE MG: 2.5 SOLUTION RESPIRATORY (INHALATION) at 03:55

## 2025-01-28 RX ADMIN — IBUPROFEN ONE MG: 600 TABLET, FILM COATED ORAL at 05:26

## 2025-02-26 ENCOUNTER — HOSPITAL ENCOUNTER (EMERGENCY)
Dept: HOSPITAL 54 - ER | Age: 75
Discharge: HOME | End: 2025-02-26
Payer: MEDICARE

## 2025-02-26 VITALS — TEMPERATURE: 98 F | DIASTOLIC BLOOD PRESSURE: 80 MMHG | SYSTOLIC BLOOD PRESSURE: 141 MMHG

## 2025-02-26 VITALS — WEIGHT: 140 LBS | HEIGHT: 60 IN | BODY MASS INDEX: 27.48 KG/M2

## 2025-02-26 VITALS — OXYGEN SATURATION: 99 %

## 2025-02-26 VITALS — OXYGEN SATURATION: 93 %

## 2025-02-26 DIAGNOSIS — Z79.51: ICD-10-CM

## 2025-02-26 DIAGNOSIS — F17.200: ICD-10-CM

## 2025-02-26 DIAGNOSIS — Z79.82: ICD-10-CM

## 2025-02-26 DIAGNOSIS — Z86.73: ICD-10-CM

## 2025-02-26 DIAGNOSIS — I10: ICD-10-CM

## 2025-02-26 DIAGNOSIS — Z79.899: ICD-10-CM

## 2025-02-26 DIAGNOSIS — Z79.52: ICD-10-CM

## 2025-02-26 DIAGNOSIS — J45.901: Primary | ICD-10-CM

## 2025-02-26 RX ADMIN — Medication ONE MG: at 03:58

## 2025-02-26 RX ADMIN — ALBUTEROL SULFATE ONE MG: 2.5 SOLUTION RESPIRATORY (INHALATION) at 03:58

## 2025-03-18 ENCOUNTER — HOSPITAL ENCOUNTER (INPATIENT)
Dept: HOSPITAL 54 - ER | Age: 75
LOS: 2 days | Discharge: HOME HEALTH | DRG: 202 | End: 2025-03-20
Attending: INTERNAL MEDICINE | Admitting: INTERNAL MEDICINE
Payer: MEDICARE

## 2025-03-18 VITALS — OXYGEN SATURATION: 92 % | TEMPERATURE: 97.7 F | DIASTOLIC BLOOD PRESSURE: 76 MMHG | SYSTOLIC BLOOD PRESSURE: 128 MMHG

## 2025-03-18 VITALS — HEIGHT: 60 IN | BODY MASS INDEX: 31.02 KG/M2 | WEIGHT: 158.03 LBS

## 2025-03-18 DIAGNOSIS — C78.01: ICD-10-CM

## 2025-03-18 DIAGNOSIS — J45.901: Primary | ICD-10-CM

## 2025-03-18 DIAGNOSIS — I50.9: ICD-10-CM

## 2025-03-18 DIAGNOSIS — F17.210: ICD-10-CM

## 2025-03-18 DIAGNOSIS — Z79.51: ICD-10-CM

## 2025-03-18 DIAGNOSIS — G47.33: ICD-10-CM

## 2025-03-18 DIAGNOSIS — E78.5: ICD-10-CM

## 2025-03-18 DIAGNOSIS — C78.02: ICD-10-CM

## 2025-03-18 DIAGNOSIS — E66.9: ICD-10-CM

## 2025-03-18 DIAGNOSIS — Z71.6: ICD-10-CM

## 2025-03-18 DIAGNOSIS — J96.01: ICD-10-CM

## 2025-03-18 DIAGNOSIS — Z20.822: ICD-10-CM

## 2025-03-18 DIAGNOSIS — I11.0: ICD-10-CM

## 2025-03-18 DIAGNOSIS — Z86.73: ICD-10-CM

## 2025-03-18 DIAGNOSIS — I25.10: ICD-10-CM

## 2025-03-18 DIAGNOSIS — Z79.82: ICD-10-CM

## 2025-03-18 DIAGNOSIS — Z85.818: ICD-10-CM

## 2025-03-18 PROCEDURE — G0378 HOSPITAL OBSERVATION PER HR: HCPCS

## 2025-03-18 RX ADMIN — ALBUTEROL SULFATE ONE MG: 2.5 SOLUTION RESPIRATORY (INHALATION) at 23:57

## 2025-03-19 VITALS — OXYGEN SATURATION: 97 %

## 2025-03-19 VITALS — OXYGEN SATURATION: 94 %

## 2025-03-19 VITALS — OXYGEN SATURATION: 91 %

## 2025-03-19 VITALS — OXYGEN SATURATION: 93 %

## 2025-03-19 VITALS — SYSTOLIC BLOOD PRESSURE: 118 MMHG | OXYGEN SATURATION: 100 % | TEMPERATURE: 99 F | DIASTOLIC BLOOD PRESSURE: 60 MMHG

## 2025-03-19 VITALS — SYSTOLIC BLOOD PRESSURE: 126 MMHG | DIASTOLIC BLOOD PRESSURE: 75 MMHG | OXYGEN SATURATION: 92 % | TEMPERATURE: 98.2 F

## 2025-03-19 VITALS — OXYGEN SATURATION: 96 %

## 2025-03-19 VITALS — OXYGEN SATURATION: 99 %

## 2025-03-19 VITALS — OXYGEN SATURATION: 95 %

## 2025-03-19 VITALS — OXYGEN SATURATION: 92 %

## 2025-03-19 LAB
ALBUMIN SERPL BCP-MCNC: 3.4 G/DL (ref 3.4–5)
ALP SERPL-CCNC: 71 U/L (ref 46–116)
ALT SERPL W P-5'-P-CCNC: 20 U/L (ref 12–78)
AST SERPL W P-5'-P-CCNC: 18 U/L (ref 15–37)
BASE EXCESS BLDA CALC-SCNC: 0.6 MMOL/L (ref -2–3)
BASOPHILS # BLD AUTO: 0.1 K/UL (ref 0–0.2)
BASOPHILS # BLD AUTO: 0.1 K/UL (ref 0–0.2)
BASOPHILS NFR BLD AUTO: 1.3 % (ref 0–2)
BASOPHILS NFR BLD AUTO: 1.3 % (ref 0–2)
BILIRUB SERPL-MCNC: 0.4 MG/DL (ref 0.2–1)
BUN SERPL-MCNC: 17 MG/DL (ref 7–18)
BUN SERPL-MCNC: 19 MG/DL (ref 7–18)
CALCIUM SERPL-MCNC: 10.1 MG/DL (ref 8.5–10.1)
CALCIUM SERPL-MCNC: 10.5 MG/DL (ref 8.5–10.1)
CHLORIDE SERPL-SCNC: 103 MMOL/L (ref 98–107)
CHLORIDE SERPL-SCNC: 103 MMOL/L (ref 98–107)
CO2 SERPL-SCNC: 23 MMOL/L (ref 21–32)
CO2 SERPL-SCNC: 26 MMOL/L (ref 21–32)
CREAT SERPL-MCNC: 1 MG/DL (ref 0.6–1.3)
CREAT SERPL-MCNC: 1.3 MG/DL (ref 0.6–1.3)
EOSINOPHIL # BLD AUTO: 0.6 K/UL (ref 0–0.7)
EOSINOPHIL # BLD AUTO: 0.7 K/UL (ref 0–0.7)
EOSINOPHIL NFR BLD AUTO: 7 % (ref 0–6)
EOSINOPHIL NFR BLD AUTO: 7.3 % (ref 0–6)
ERYTHROCYTE [DISTWIDTH] IN BLOOD BY AUTOMATED COUNT: 13.9 % (ref 11.5–15)
ERYTHROCYTE [DISTWIDTH] IN BLOOD BY AUTOMATED COUNT: 14.3 % (ref 11.5–15)
GAS PNL BLDA: 13.6 G/DL (ref 12–16)
GLUCOSE SERPL-MCNC: 114 MG/DL (ref 74–106)
GLUCOSE SERPL-MCNC: 165 MG/DL (ref 74–106)
HCT VFR BLD AUTO: 40 % (ref 33–45)
HCT VFR BLD AUTO: 41 % (ref 33–45)
HGB BLD-MCNC: 13.2 G/DL (ref 11.5–14.8)
HGB BLD-MCNC: 13.5 G/DL (ref 11.5–14.8)
INHALED O2 CONCENTRATION: 21 %
INHALED O2 FLOW RATE: 0 L/MIN (ref 0–30)
LACTATE SERPL-SCNC: 1.3 MMOL/L (ref 0.4–2)
LYMPHOCYTES NFR BLD AUTO: 1.2 K/UL (ref 0.8–4.8)
LYMPHOCYTES NFR BLD AUTO: 1.8 K/UL (ref 0.8–4.8)
LYMPHOCYTES NFR BLD AUTO: 15.8 % (ref 20–44)
LYMPHOCYTES NFR BLD AUTO: 18.1 % (ref 20–44)
MAGNESIUM SERPL-MCNC: 2 MG/DL (ref 1.8–2.4)
MCH RBC QN AUTO: 29 PG (ref 26–33)
MCH RBC QN AUTO: 29 PG (ref 26–33)
MCHC RBC AUTO-ENTMCNC: 33 G/DL (ref 31–36)
MCHC RBC AUTO-ENTMCNC: 33 G/DL (ref 31–36)
MCV RBC AUTO: 87 FL (ref 82–100)
MCV RBC AUTO: 88 FL (ref 82–100)
MONOCYTES NFR BLD AUTO: 0.4 K/UL (ref 0.1–1.3)
MONOCYTES NFR BLD AUTO: 0.6 K/UL (ref 0.1–1.3)
MONOCYTES NFR BLD AUTO: 5.1 % (ref 2–12)
MONOCYTES NFR BLD AUTO: 6.6 % (ref 2–12)
NEUTROPHILS # BLD AUTO: 5.5 K/UL (ref 1.8–8.9)
NEUTROPHILS # BLD AUTO: 6.5 K/UL (ref 1.8–8.9)
NEUTROPHILS NFR BLD AUTO: 67 % (ref 43–81)
NEUTROPHILS NFR BLD AUTO: 70.5 % (ref 43–81)
NT-PROBNP SERPL-MCNC: 878 PG/ML (ref 0–125)
PCO2 TEMP ADJ BLDA: 37.4 MMHG (ref 32–45)
PH TEMP ADJ BLDA: 7.43 [PH] (ref 7.35–7.45)
PHOSPHATE SERPL-MCNC: 3.7 MG/DL (ref 2.5–4.9)
PLATELET # BLD AUTO: 233 K/UL (ref 150–450)
PLATELET # BLD AUTO: 272 K/UL (ref 150–450)
PO2 TEMP ADJ BLDA: 59.9 MMHG (ref 83–108)
POTASSIUM SERPL-SCNC: 3.5 MMOL/L (ref 3.5–5.1)
POTASSIUM SERPL-SCNC: 3.8 MMOL/L (ref 3.5–5.1)
PROT SERPL-MCNC: 7.9 G/DL (ref 6.4–8.2)
RBC # BLD AUTO: 4.61 MIL/UL (ref 4–5.2)
RBC # BLD AUTO: 4.71 MIL/UL (ref 4–5.2)
SAO2 % BLDA: 90.1 % (ref 94–98)
SODIUM SERPL-SCNC: 135 MMOL/L (ref 136–145)
SODIUM SERPL-SCNC: 138 MMOL/L (ref 136–145)
SPECIMEN DRAWN FROM PATIENT: (no result)
WBC NRBC COR # BLD AUTO: 7.8 K/UL (ref 4.3–11)
WBC NRBC COR # BLD AUTO: 9.8 K/UL (ref 4.3–11)

## 2025-03-19 RX ADMIN — BUDESONIDE SCH MG: 0.5 SUSPENSION RESPIRATORY (INHALATION) at 08:44

## 2025-03-19 RX ADMIN — ASPIRIN 81 MG SCH MG: 81 TABLET ORAL at 08:42

## 2025-03-19 RX ADMIN — METOPROLOL TARTRATE SCH MG: 25 TABLET, FILM COATED ORAL at 08:43

## 2025-03-19 RX ADMIN — ACETAMINOPHEN PRN MG: 325 TABLET ORAL at 23:23

## 2025-03-19 RX ADMIN — Medication SCH MG: at 05:34

## 2025-03-19 RX ADMIN — Medication ONE MG: at 00:08

## 2025-03-19 RX ADMIN — VALSARTAN SCH MG: 80 TABLET ORAL at 08:44

## 2025-03-19 RX ADMIN — ENOXAPARIN SODIUM SCH MG: 40 INJECTION SUBCUTANEOUS at 08:48

## 2025-03-19 RX ADMIN — METOPROLOL TARTRATE SCH MG: 25 TABLET, FILM COATED ORAL at 16:59

## 2025-03-19 RX ADMIN — POTASSIUM CHLORIDE SCH MEQ: 1500 TABLET, EXTENDED RELEASE ORAL at 08:43

## 2025-03-19 RX ADMIN — LEVALBUTEROL HYDROCHLORIDE SCH MG: 1.25 SOLUTION, CONCENTRATE RESPIRATORY (INHALATION) at 05:10

## 2025-03-20 VITALS — TEMPERATURE: 97.5 F | SYSTOLIC BLOOD PRESSURE: 116 MMHG | OXYGEN SATURATION: 97 % | DIASTOLIC BLOOD PRESSURE: 69 MMHG

## 2025-03-20 VITALS — OXYGEN SATURATION: 91 %

## 2025-03-20 VITALS — SYSTOLIC BLOOD PRESSURE: 136 MMHG | DIASTOLIC BLOOD PRESSURE: 60 MMHG

## 2025-03-20 VITALS — TEMPERATURE: 97.7 F | SYSTOLIC BLOOD PRESSURE: 131 MMHG | DIASTOLIC BLOOD PRESSURE: 57 MMHG | OXYGEN SATURATION: 97 %

## 2025-03-20 VITALS — SYSTOLIC BLOOD PRESSURE: 132 MMHG | DIASTOLIC BLOOD PRESSURE: 62 MMHG | OXYGEN SATURATION: 97 % | TEMPERATURE: 97.7 F

## 2025-03-20 VITALS — OXYGEN SATURATION: 90 %

## 2025-03-20 VITALS — OXYGEN SATURATION: 88 %

## 2025-03-20 VITALS — OXYGEN SATURATION: 97 %

## 2025-03-20 VITALS — OXYGEN SATURATION: 92 %

## 2025-04-13 ENCOUNTER — HOSPITAL ENCOUNTER (EMERGENCY)
Dept: HOSPITAL 54 - ER | Age: 75
Discharge: HOME | End: 2025-04-13
Payer: MEDICARE

## 2025-04-13 VITALS — HEIGHT: 61 IN | BODY MASS INDEX: 26.43 KG/M2 | WEIGHT: 140 LBS

## 2025-04-13 VITALS — DIASTOLIC BLOOD PRESSURE: 76 MMHG | SYSTOLIC BLOOD PRESSURE: 132 MMHG | OXYGEN SATURATION: 95 % | TEMPERATURE: 98 F

## 2025-04-13 VITALS — OXYGEN SATURATION: 94 %

## 2025-04-13 VITALS — OXYGEN SATURATION: 100 %

## 2025-04-13 DIAGNOSIS — Z79.899: ICD-10-CM

## 2025-04-13 DIAGNOSIS — C34.90: ICD-10-CM

## 2025-04-13 DIAGNOSIS — F17.200: ICD-10-CM

## 2025-04-13 DIAGNOSIS — Z79.82: ICD-10-CM

## 2025-04-13 DIAGNOSIS — I10: ICD-10-CM

## 2025-04-13 DIAGNOSIS — Z88.1: ICD-10-CM

## 2025-04-13 DIAGNOSIS — J44.1: Primary | ICD-10-CM

## 2025-04-13 LAB
BASOPHILS # BLD AUTO: 0.2 K/UL (ref 0–0.2)
BASOPHILS NFR BLD AUTO: 1.5 % (ref 0–2)
CALCIUM SERPL-MCNC: 10.4 MG/DL (ref 8.5–10.1)
CREAT SERPL-MCNC: 1.1 MG/DL (ref 0.6–1.3)
EOSINOPHIL # BLD AUTO: 1.1 K/UL (ref 0–0.7)
EOSINOPHIL NFR BLD AUTO: 10.3 % (ref 0–6)
ERYTHROCYTE [DISTWIDTH] IN BLOOD BY AUTOMATED COUNT: 14.1 % (ref 11.5–15)
HCT VFR BLD AUTO: 41 % (ref 33–45)
HGB BLD-MCNC: 13.5 G/DL (ref 11.5–14.8)
LYMPHOCYTES NFR BLD AUTO: 2.9 K/UL (ref 0.8–4.8)
LYMPHOCYTES NFR BLD AUTO: 28.1 % (ref 20–44)
MCH RBC QN AUTO: 28 PG (ref 26–33)
MCHC RBC AUTO-ENTMCNC: 33 G/DL (ref 31–36)
MCV RBC AUTO: 85 FL (ref 82–100)
MONOCYTES NFR BLD AUTO: 0.9 K/UL (ref 0.1–1.3)
MONOCYTES NFR BLD AUTO: 8.3 % (ref 2–12)
NEUTROPHILS # BLD AUTO: 5.3 K/UL (ref 1.8–8.9)
NEUTROPHILS NFR BLD AUTO: 51.8 % (ref 43–81)
PLATELET # BLD AUTO: 199 K/UL (ref 150–450)
POTASSIUM SERPL-SCNC: 3.7 MMOL/L (ref 3.5–5.1)
RBC # BLD AUTO: 4.79 MIL/UL (ref 4–5.2)
WBC NRBC COR # BLD AUTO: 10.3 K/UL (ref 4.3–11)

## 2025-04-13 PROCEDURE — 99285 EMERGENCY DEPT VISIT HI MDM: CPT

## 2025-04-13 PROCEDURE — 96375 TX/PRO/DX INJ NEW DRUG ADDON: CPT

## 2025-04-13 PROCEDURE — 36415 COLL VENOUS BLD VENIPUNCTURE: CPT

## 2025-04-13 PROCEDURE — 71045 X-RAY EXAM CHEST 1 VIEW: CPT

## 2025-04-13 PROCEDURE — 93005 ELECTROCARDIOGRAM TRACING: CPT

## 2025-04-13 PROCEDURE — 96365 THER/PROPH/DIAG IV INF INIT: CPT

## 2025-04-13 PROCEDURE — 80048 BASIC METABOLIC PNL TOTAL CA: CPT

## 2025-04-13 PROCEDURE — 94640 AIRWAY INHALATION TREATMENT: CPT

## 2025-04-13 PROCEDURE — 85025 COMPLETE CBC W/AUTO DIFF WBC: CPT

## 2025-04-13 RX ADMIN — ALBUTEROL SULFATE ONE MG: 2.5 SOLUTION RESPIRATORY (INHALATION) at 01:53

## 2025-04-13 RX ADMIN — MAGNESIUM SULFATE IN DEXTROSE ONE MLS/HR: 10 INJECTION, SOLUTION INTRAVENOUS at 02:32

## 2025-04-13 RX ADMIN — Medication ONE MG: at 01:53

## 2025-06-14 ENCOUNTER — HOSPITAL ENCOUNTER (EMERGENCY)
Dept: HOSPITAL 54 - ER | Age: 75
Discharge: HOME | End: 2025-06-14
Payer: MEDICARE

## 2025-06-14 VITALS — SYSTOLIC BLOOD PRESSURE: 147 MMHG | DIASTOLIC BLOOD PRESSURE: 81 MMHG | OXYGEN SATURATION: 94 % | TEMPERATURE: 98 F

## 2025-06-14 VITALS — WEIGHT: 135 LBS | BODY MASS INDEX: 26.5 KG/M2 | HEIGHT: 60 IN

## 2025-06-14 VITALS — OXYGEN SATURATION: 91 %

## 2025-06-14 VITALS — OXYGEN SATURATION: 100 %

## 2025-06-14 DIAGNOSIS — Z79.82: ICD-10-CM

## 2025-06-14 DIAGNOSIS — Z79.52: ICD-10-CM

## 2025-06-14 DIAGNOSIS — Z88.1: ICD-10-CM

## 2025-06-14 DIAGNOSIS — Z87.09: ICD-10-CM

## 2025-06-14 DIAGNOSIS — I10: ICD-10-CM

## 2025-06-14 DIAGNOSIS — Z88.8: ICD-10-CM

## 2025-06-14 DIAGNOSIS — F17.200: ICD-10-CM

## 2025-06-14 DIAGNOSIS — C34.90: ICD-10-CM

## 2025-06-14 DIAGNOSIS — J98.01: Primary | ICD-10-CM

## 2025-06-14 DIAGNOSIS — Z79.899: ICD-10-CM

## 2025-06-14 LAB
ALBUMIN SERPL BCP-MCNC: 3.8 G/DL (ref 3.4–5)
ALP SERPL-CCNC: 82 U/L (ref 46–116)
ALT SERPL W P-5'-P-CCNC: 38 U/L (ref 12–78)
AMORPH PHOS CRY URNS QL MICRO: (no result) /HPF
AMORPH URATE CRY URNS QL MICRO: (no result) /HPF
ANISOCYTOSIS BLD QL: (no result)
APPEARANCE UR: CLEAR
APTT PPP: 30.5 SEC (ref 24.3–34.3)
AST SERPL W P-5'-P-CCNC: 29 U/L (ref 15–37)
AUER BODIES BLD QL SMEAR: (no result)
BACTERIA UR CULT: NO
BASOPHILS # BLD AUTO: 0.1 K/UL (ref 0–0.2)
BASOPHILS NFR BLD AUTO: 1.9 % (ref 0–2)
BILIRUB DIRECT SERPL-MCNC: 0.1 MG/DL (ref 0–0.2)
BILIRUB SERPL-MCNC: 0.4 MG/DL (ref 0.2–1)
BILIRUB UR QL STRIP: NEGATIVE
BUN SERPL-MCNC: 31 MG/DL (ref 7–18)
CA CARBONATE CRY #/AREA URNS HPF: (no result) /HPF
CA PHOS CRY URNS QL MICRO: (no result) /HPF
CABOT RINGS BLD QL SMEAR: (no result)
CALCIUM SERPL-MCNC: 9.9 MG/DL (ref 8.5–10.1)
CAOX CRY URNS QL MICRO: (no result) /HPF
CASTS URNS QL MICRO: (no result) /LPF
CHLORIDE SERPL-SCNC: 101 MMOL/L (ref 98–107)
CO2 SERPL-SCNC: 29 MMOL/L (ref 21–32)
COARSE GRAN CASTS URNS QL MICRO: (no result) /LPF
COLOR UR: YELLOW
CREAT SERPL-MCNC: 1.1 MG/DL (ref 0.6–1.3)
CRYSTALS URNS QL MICRO: (no result) /HPF
CYSTINE CRY #/AREA URNS HPF: (no result) /HPF
DACRYOCYTES BLD QL SMEAR: (no result)
DEPRECATED SQUAMOUS URNS QL MICRO: (no result) /HPF
DOHLE BOD BLD QL SMEAR: (no result)
EOSINOPHIL # BLD AUTO: 0.4 K/UL (ref 0–0.7)
EOSINOPHIL NFR BLD AUTO: 6.7 % (ref 0–6)
ERYTHROCYTE [DISTWIDTH] IN BLOOD BY AUTOMATED COUNT: 14.7 % (ref 11.5–15)
FATTY CASTS URNS QL MICRO: (no result) /LPF
FINE GRAN CASTS URNS QL MICRO: (no result) /LPF
GLUCOSE SERPL-MCNC: 120 MG/DL (ref 74–106)
GLUCOSE UR STRIP-MCNC: NEGATIVE MG/DL
HCT VFR BLD AUTO: 42 % (ref 33–45)
HELMET CELLS BLD QL SMEAR: (no result)
HGB BLD-MCNC: 13.9 G/DL (ref 11.5–14.8)
HGB UR QL STRIP: (no result) ERY/UL
HOWELL-JOLLY BOD BLD QL SMEAR: (no result)
HYALINE CASTS URNS QL MICRO: (no result) /LPF
HYPOCHROMIA BLD QL: (no result)
INR PPP: 1.02 (ref 0.91–1.1)
KETONES UR STRIP-MCNC: NEGATIVE MG/DL
LEUKOCYTE ESTERASE UR QL STRIP: NEGATIVE
LYMPHOCYTES NFR BLD AUTO: 1 K/UL (ref 0.8–4.8)
LYMPHOCYTES NFR BLD AUTO: 16.5 % (ref 20–44)
MACROCYTES BLD QL: (no result)
MCH RBC QN AUTO: 28 PG (ref 26–33)
MCHC RBC AUTO-ENTMCNC: 33 G/DL (ref 31–36)
MCV RBC AUTO: 85 FL (ref 82–100)
MONOCYTES NFR BLD AUTO: 0.8 K/UL (ref 0.1–1.3)
MONOCYTES NFR BLD AUTO: 12.9 % (ref 2–12)
MUCOUS THREADS URNS QL MICRO: (no result) /LPF
NEUTROPHILS # BLD AUTO: 3.9 K/UL (ref 1.8–8.9)
NEUTROPHILS NFR BLD AUTO: 62 % (ref 43–81)
NITRITE UR QL STRIP: NEGATIVE
NT-PROBNP SERPL-MCNC: 903 PG/ML (ref 0–125)
OVALOCYTES BLD QL SMEAR: (no result)
PAPPENHEIMER BOD BLD QL SMEAR: (no result)
PH UR STRIP: 6 [PH] (ref 5–8)
PLATELET # BLD AUTO: 175 K/UL (ref 150–450)
PLATELET BLD QL SMEAR: (no result)
POTASSIUM SERPL-SCNC: 3.9 MMOL/L (ref 3.5–5.1)
PROT SERPL-MCNC: 7.8 G/DL (ref 6.4–8.2)
PROT UR QL STRIP: (no result) MG/DL
PROTHROMBIN TIME: 10.8 SECS (ref 9.2–11.1)
RBC # BLD AUTO: 4.93 MIL/UL (ref 4–5.2)
RBC CASTS URNS QL MICRO: (no result) /LPF
ROULEAUX BLD QL SMEAR: (no result)
SODIUM SERPL-SCNC: 138 MMOL/L (ref 136–145)
SP GR UR STRIP: 1.02 (ref 1–1.03)
SPERM URNS QL MICRO: (no result) /HPF
STOMATOCYTES BLD QL SMEAR: (no result)
T VAGINALIS URNS QL MICRO: (no result) /HPF
TARGETS BLD QL SMEAR: (no result)
TOXIC GRANULES BLD QL SMEAR: (no result)
TRI-PHOS CRY URNS QL MICRO: (no result) /HPF
TYROSINE CRY URNS QL MICRO: (no result) /HPF
URATE CRY URNS QL MICRO: (no result) /HPF
UROBILINOGEN UR STRIP-MCNC: 0.2 EU/DL
WAXY CASTS URNS QL MICRO: (no result) /LPF
WBC #/AREA URNS HPF: (no result) /HPF
WBC #/AREA URNS HPF: (no result) /HPF (ref 0–3)
WBC NRBC COR # BLD AUTO: 6.3 K/UL (ref 4.3–11)
YEAST URNS QL MICRO: (no result) /HPF

## 2025-06-14 PROCEDURE — 85730 THROMBOPLASTIN TIME PARTIAL: CPT

## 2025-06-14 PROCEDURE — 85025 COMPLETE CBC W/AUTO DIFF WBC: CPT

## 2025-06-14 PROCEDURE — 83880 ASSAY OF NATRIURETIC PEPTIDE: CPT

## 2025-06-14 PROCEDURE — 71275 CT ANGIOGRAPHY CHEST: CPT

## 2025-06-14 PROCEDURE — 93005 ELECTROCARDIOGRAM TRACING: CPT

## 2025-06-14 PROCEDURE — 96372 THER/PROPH/DIAG INJ SC/IM: CPT

## 2025-06-14 PROCEDURE — 36415 COLL VENOUS BLD VENIPUNCTURE: CPT

## 2025-06-14 PROCEDURE — 81001 URINALYSIS AUTO W/SCOPE: CPT

## 2025-06-14 PROCEDURE — 84484 ASSAY OF TROPONIN QUANT: CPT

## 2025-06-14 PROCEDURE — 99285 EMERGENCY DEPT VISIT HI MDM: CPT

## 2025-06-14 PROCEDURE — 80048 BASIC METABOLIC PNL TOTAL CA: CPT

## 2025-06-14 PROCEDURE — 94640 AIRWAY INHALATION TREATMENT: CPT

## 2025-06-14 PROCEDURE — 80076 HEPATIC FUNCTION PANEL: CPT

## 2025-06-14 PROCEDURE — 71045 X-RAY EXAM CHEST 1 VIEW: CPT

## 2025-06-14 RX ADMIN — DEXTROSE MONOHYDRATE ONE MG: 50 INJECTION, SOLUTION INTRAVENOUS at 01:53

## 2025-06-14 RX ADMIN — ALBUTEROL SULFATE ONE MG: 2.5 SOLUTION RESPIRATORY (INHALATION) at 01:45

## 2025-06-14 RX ADMIN — Medication ONE MG: at 01:45

## 2025-07-23 ENCOUNTER — HOSPITAL ENCOUNTER (EMERGENCY)
Dept: HOSPITAL 54 - ER | Age: 75
Discharge: HOME | End: 2025-07-23
Payer: MEDICARE

## 2025-07-23 VITALS — HEIGHT: 60 IN | WEIGHT: 135 LBS | BODY MASS INDEX: 26.5 KG/M2

## 2025-07-23 VITALS — DIASTOLIC BLOOD PRESSURE: 82 MMHG | SYSTOLIC BLOOD PRESSURE: 140 MMHG | OXYGEN SATURATION: 97 %

## 2025-07-23 VITALS — TEMPERATURE: 98.2 F

## 2025-07-23 DIAGNOSIS — Z88.8: ICD-10-CM

## 2025-07-23 DIAGNOSIS — Z79.52: ICD-10-CM

## 2025-07-23 DIAGNOSIS — Z79.82: ICD-10-CM

## 2025-07-23 DIAGNOSIS — R11.2: ICD-10-CM

## 2025-07-23 DIAGNOSIS — I10: ICD-10-CM

## 2025-07-23 DIAGNOSIS — Z79.899: ICD-10-CM

## 2025-07-23 DIAGNOSIS — R10.13: Primary | ICD-10-CM

## 2025-07-23 DIAGNOSIS — F17.200: ICD-10-CM

## 2025-07-23 DIAGNOSIS — Z87.09: ICD-10-CM

## 2025-07-23 LAB
ALBUMIN SERPL BCP-MCNC: 3.8 G/DL (ref 3.4–5)
ALP SERPL-CCNC: 87 U/L (ref 46–116)
ALT SERPL W P-5'-P-CCNC: 26 U/L (ref 12–78)
AMORPH PHOS CRY URNS QL MICRO: (no result) /HPF
AMORPH URATE CRY URNS QL MICRO: (no result) /HPF
ANISOCYTOSIS BLD QL: (no result)
APPEARANCE UR: CLEAR
AST SERPL W P-5'-P-CCNC: 15 U/L (ref 15–37)
AUER BODIES BLD QL SMEAR: (no result)
BACTERIA UR CULT: YES
BASOPHILS # BLD AUTO: 0.1 K/UL (ref 0–0.2)
BASOPHILS NFR BLD AUTO: 0.9 % (ref 0–2)
BILIRUB DIRECT SERPL-MCNC: 0.1 MG/DL (ref 0–0.2)
BILIRUB SERPL-MCNC: 0.9 MG/DL (ref 0.2–1)
BILIRUB UR QL STRIP: (no result)
BUN SERPL-MCNC: 36 MG/DL (ref 7–18)
CA CARBONATE CRY #/AREA URNS HPF: (no result) /HPF
CA PHOS CRY URNS QL MICRO: (no result) /HPF
CABOT RINGS BLD QL SMEAR: (no result)
CALCIUM SERPL-MCNC: 10.2 MG/DL (ref 8.5–10.1)
CAOX CRY URNS QL MICRO: (no result) /HPF
CASTS URNS QL MICRO: (no result) /LPF
CHLORIDE SERPL-SCNC: 99 MMOL/L (ref 98–107)
CO2 SERPL-SCNC: 27 MMOL/L (ref 21–32)
COARSE GRAN CASTS URNS QL MICRO: (no result) /LPF
COLOR UR: YELLOW
CREAT SERPL-MCNC: 1.1 MG/DL (ref 0.6–1.3)
CRYSTALS URNS QL MICRO: (no result) /HPF
CYSTINE CRY #/AREA URNS HPF: (no result) /HPF
DACRYOCYTES BLD QL SMEAR: (no result)
DOHLE BOD BLD QL SMEAR: (no result)
EOSINOPHIL # BLD AUTO: 0.2 K/UL (ref 0–0.7)
EOSINOPHIL NFR BLD AUTO: 1.6 % (ref 0–6)
ERYTHROCYTE [DISTWIDTH] IN BLOOD BY AUTOMATED COUNT: 14.6 % (ref 11.5–15)
FATTY CASTS URNS QL MICRO: (no result) /LPF
FINE GRAN CASTS URNS QL MICRO: (no result) /LPF
GLUCOSE SERPL-MCNC: 105 MG/DL (ref 74–106)
GLUCOSE UR STRIP-MCNC: NEGATIVE MG/DL
HCT VFR BLD AUTO: 44 % (ref 33–45)
HELMET CELLS BLD QL SMEAR: (no result)
HGB BLD-MCNC: 14.8 G/DL (ref 11.5–14.8)
HGB UR QL STRIP: (no result) ERY/UL
HOWELL-JOLLY BOD BLD QL SMEAR: (no result)
HYALINE CASTS URNS QL MICRO: (no result) /LPF
HYPOCHROMIA BLD QL: (no result)
KETONES UR STRIP-MCNC: (no result) MG/DL
LEUKOCYTE ESTERASE UR QL STRIP: (no result)
LIPASE SERPL-CCNC: 84 U/L (ref 16–77)
LYMPHOCYTES NFR BLD AUTO: 1.9 K/UL (ref 0.8–4.8)
LYMPHOCYTES NFR BLD AUTO: 19.5 % (ref 20–44)
MACROCYTES BLD QL: (no result)
MCH RBC QN AUTO: 29 PG (ref 26–33)
MCHC RBC AUTO-ENTMCNC: 34 G/DL (ref 31–36)
MCV RBC AUTO: 87 FL (ref 82–100)
MONOCYTES NFR BLD AUTO: 0.8 K/UL (ref 0.1–1.3)
MONOCYTES NFR BLD AUTO: 8.3 % (ref 2–12)
MUCOUS THREADS URNS QL MICRO: (no result) /LPF
NEUTROPHILS # BLD AUTO: 6.9 K/UL (ref 1.8–8.9)
NEUTROPHILS NFR BLD AUTO: 69.7 % (ref 43–81)
NITRITE UR QL STRIP: NEGATIVE
OVALOCYTES BLD QL SMEAR: (no result)
PAPPENHEIMER BOD BLD QL SMEAR: (no result)
PH UR STRIP: 6.5 [PH] (ref 5–8)
PLATELET # BLD AUTO: 219 K/UL (ref 150–450)
PLATELET BLD QL SMEAR: (no result)
POTASSIUM SERPL-SCNC: 3.6 MMOL/L (ref 3.5–5.1)
PROT SERPL-MCNC: 8 G/DL (ref 6.4–8.2)
PROT UR QL STRIP: (no result) MG/DL
RBC # BLD AUTO: 5.07 MIL/UL (ref 4–5.2)
RBC #/AREA URNS HPF: (no result) /HPF (ref 0–2)
RBC CASTS URNS QL MICRO: (no result) /LPF
ROULEAUX BLD QL SMEAR: (no result)
SODIUM SERPL-SCNC: 137 MMOL/L (ref 136–145)
SP GR UR STRIP: 1.02 (ref 1–1.03)
SPERM URNS QL MICRO: (no result) /HPF
STOMATOCYTES BLD QL SMEAR: (no result)
T VAGINALIS URNS QL MICRO: (no result) /HPF
TARGETS BLD QL SMEAR: (no result)
TOXIC GRANULES BLD QL SMEAR: (no result)
TRI-PHOS CRY URNS QL MICRO: (no result) /HPF
TYROSINE CRY URNS QL MICRO: (no result) /HPF
URATE CRY URNS QL MICRO: (no result) /HPF
UROBILINOGEN UR STRIP-MCNC: 0.2 EU/DL
WAXY CASTS URNS QL MICRO: (no result) /LPF
WBC #/AREA URNS HPF: (no result) /HPF
WBC #/AREA URNS HPF: (no result) /HPF (ref 0–3)
WBC NRBC COR # BLD AUTO: 9.9 K/UL (ref 4.3–11)
YEAST URNS QL MICRO: (no result) /HPF

## 2025-07-23 PROCEDURE — 85025 COMPLETE CBC W/AUTO DIFF WBC: CPT

## 2025-07-23 PROCEDURE — 81001 URINALYSIS AUTO W/SCOPE: CPT

## 2025-07-23 PROCEDURE — 99284 EMERGENCY DEPT VISIT MOD MDM: CPT

## 2025-07-23 PROCEDURE — 36415 COLL VENOUS BLD VENIPUNCTURE: CPT

## 2025-07-23 PROCEDURE — 96361 HYDRATE IV INFUSION ADD-ON: CPT

## 2025-07-23 PROCEDURE — A4223 INFUSION SUPPLIES W/O PUMP: HCPCS

## 2025-07-23 PROCEDURE — 80076 HEPATIC FUNCTION PANEL: CPT

## 2025-07-23 PROCEDURE — 93005 ELECTROCARDIOGRAM TRACING: CPT

## 2025-07-23 PROCEDURE — 96375 TX/PRO/DX INJ NEW DRUG ADDON: CPT

## 2025-07-23 PROCEDURE — 83690 ASSAY OF LIPASE: CPT

## 2025-07-23 PROCEDURE — 80048 BASIC METABOLIC PNL TOTAL CA: CPT

## 2025-07-23 PROCEDURE — 96374 THER/PROPH/DIAG INJ IV PUSH: CPT

## 2025-07-23 RX ADMIN — FAMOTIDINE ONE MG: 10 INJECTION INTRAVENOUS at 09:38

## 2025-07-23 RX ADMIN — DEXTROSE MONOHYDRATE ONE MG: 50 INJECTION, SOLUTION INTRAVENOUS at 09:15

## 2025-07-23 RX ADMIN — ALUMINUM HYDROXIDE, MAGNESIUM HYDROXIDE, AND SIMETHICONE ONE ML: 200; 200; 20 SUSPENSION ORAL at 09:38

## 2025-07-23 RX ADMIN — SODIUM CHLORIDE ONE ML: 9 INJECTION, SOLUTION INTRAVENOUS at 09:15

## 2025-07-23 RX ADMIN — LIDOCAINE HYDROCHLORIDE ONE ML: 20 SOLUTION ORAL; TOPICAL at 09:38
